# Patient Record
Sex: MALE | Race: WHITE | NOT HISPANIC OR LATINO | Employment: OTHER | URBAN - NONMETROPOLITAN AREA
[De-identification: names, ages, dates, MRNs, and addresses within clinical notes are randomized per-mention and may not be internally consistent; named-entity substitution may affect disease eponyms.]

---

## 2023-04-18 ENCOUNTER — TELEPHONE (OUTPATIENT)
Dept: PRIMARY CARE | Facility: CLINIC | Age: 73
End: 2023-04-18
Payer: MEDICARE

## 2023-04-19 DIAGNOSIS — E78.00 HYPERCHOLESTEROLEMIA: Primary | ICD-10-CM

## 2023-04-19 RX ORDER — CLONAZEPAM 0.5 MG/1
TABLET ORAL
COMMUNITY
Start: 2022-05-06

## 2023-04-19 RX ORDER — ALPRAZOLAM 0.5 MG/1
TABLET ORAL
COMMUNITY
End: 2023-05-17

## 2023-04-19 RX ORDER — ATORVASTATIN CALCIUM 20 MG/1
1 TABLET, FILM COATED ORAL DAILY
COMMUNITY
Start: 2022-01-07 | End: 2023-04-19 | Stop reason: SDUPTHER

## 2023-04-19 RX ORDER — AMOXICILLIN 500 MG/1
1 CAPSULE ORAL
COMMUNITY
Start: 2022-08-29 | End: 2023-05-17

## 2023-04-19 RX ORDER — ATORVASTATIN CALCIUM 20 MG/1
20 TABLET, FILM COATED ORAL DAILY
Qty: 30 TABLET | Refills: 3 | Status: SHIPPED | OUTPATIENT
Start: 2023-04-19 | End: 2023-12-12 | Stop reason: SDUPTHER

## 2023-05-16 LAB
ALANINE AMINOTRANSFERASE (SGPT) (U/L) IN SER/PLAS: 30 U/L (ref 10–52)
ALBUMIN (G/DL) IN SER/PLAS: 4.1 G/DL (ref 3.4–5)
ALKALINE PHOSPHATASE (U/L) IN SER/PLAS: 39 U/L (ref 33–136)
ANION GAP IN SER/PLAS: 9 MMOL/L (ref 10–20)
ASPARTATE AMINOTRANSFERASE (SGOT) (U/L) IN SER/PLAS: 22 U/L (ref 9–39)
BASOPHILS (10*3/UL) IN BLOOD BY AUTOMATED COUNT: 0.04 X10E9/L (ref 0–0.1)
BASOPHILS/100 LEUKOCYTES IN BLOOD BY AUTOMATED COUNT: 0.7 % (ref 0–2)
BILIRUBIN TOTAL (MG/DL) IN SER/PLAS: 0.8 MG/DL (ref 0–1.2)
CALCIUM (MG/DL) IN SER/PLAS: 9 MG/DL (ref 8.6–10.3)
CARBON DIOXIDE, TOTAL (MMOL/L) IN SER/PLAS: 26 MMOL/L (ref 21–32)
CHLORIDE (MMOL/L) IN SER/PLAS: 107 MMOL/L (ref 98–107)
CHOLESTEROL (MG/DL) IN SER/PLAS: 152 MG/DL (ref 0–199)
CHOLESTEROL IN HDL (MG/DL) IN SER/PLAS: 52 MG/DL
CHOLESTEROL/HDL RATIO: 2.9
CREATININE (MG/DL) IN SER/PLAS: 0.98 MG/DL (ref 0.5–1.3)
EOSINOPHILS (10*3/UL) IN BLOOD BY AUTOMATED COUNT: 0.2 X10E9/L (ref 0–0.4)
EOSINOPHILS/100 LEUKOCYTES IN BLOOD BY AUTOMATED COUNT: 3.3 % (ref 0–6)
ERYTHROCYTE DISTRIBUTION WIDTH (RATIO) BY AUTOMATED COUNT: 12.6 % (ref 11.5–14.5)
ERYTHROCYTE MEAN CORPUSCULAR HEMOGLOBIN CONCENTRATION (G/DL) BY AUTOMATED: 33.3 G/DL (ref 32–36)
ERYTHROCYTE MEAN CORPUSCULAR VOLUME (FL) BY AUTOMATED COUNT: 96 FL (ref 80–100)
ERYTHROCYTES (10*6/UL) IN BLOOD BY AUTOMATED COUNT: 4.71 X10E12/L (ref 4.5–5.9)
GFR MALE: 82 ML/MIN/1.73M2
GLUCOSE (MG/DL) IN SER/PLAS: 85 MG/DL (ref 74–99)
HEMATOCRIT (%) IN BLOOD BY AUTOMATED COUNT: 45.3 % (ref 41–52)
HEMOGLOBIN (G/DL) IN BLOOD: 15.1 G/DL (ref 13.5–17.5)
IMMATURE GRANULOCYTES/100 LEUKOCYTES IN BLOOD BY AUTOMATED COUNT: 0.2 % (ref 0–0.9)
LDL: 81 MG/DL (ref 0–99)
LEUKOCYTES (10*3/UL) IN BLOOD BY AUTOMATED COUNT: 6.1 X10E9/L (ref 4.4–11.3)
LYMPHOCYTES (10*3/UL) IN BLOOD BY AUTOMATED COUNT: 2.83 X10E9/L (ref 0.8–3)
LYMPHOCYTES/100 LEUKOCYTES IN BLOOD BY AUTOMATED COUNT: 46.6 % (ref 13–44)
MONOCYTES (10*3/UL) IN BLOOD BY AUTOMATED COUNT: 0.57 X10E9/L (ref 0.05–0.8)
MONOCYTES/100 LEUKOCYTES IN BLOOD BY AUTOMATED COUNT: 9.4 % (ref 2–10)
NEUTROPHILS (10*3/UL) IN BLOOD BY AUTOMATED COUNT: 2.42 X10E9/L (ref 1.6–5.5)
NEUTROPHILS/100 LEUKOCYTES IN BLOOD BY AUTOMATED COUNT: 39.8 % (ref 40–80)
PLATELETS (10*3/UL) IN BLOOD AUTOMATED COUNT: 221 X10E9/L (ref 150–450)
POTASSIUM (MMOL/L) IN SER/PLAS: 4.4 MMOL/L (ref 3.5–5.3)
PROSTATE SPECIFIC ANTIGEN,SCREEN: 0.75 NG/ML (ref 0–4)
PROTEIN TOTAL: 6.5 G/DL (ref 6.4–8.2)
SODIUM (MMOL/L) IN SER/PLAS: 138 MMOL/L (ref 136–145)
TRIGLYCERIDE (MG/DL) IN SER/PLAS: 93 MG/DL (ref 0–149)
UREA NITROGEN (MG/DL) IN SER/PLAS: 14 MG/DL (ref 6–23)
VLDL: 19 MG/DL (ref 0–40)

## 2023-05-17 ENCOUNTER — OFFICE VISIT (OUTPATIENT)
Dept: PRIMARY CARE | Facility: CLINIC | Age: 73
End: 2023-05-17
Payer: MEDICARE

## 2023-05-17 VITALS
OXYGEN SATURATION: 95 % | BODY MASS INDEX: 26.41 KG/M2 | HEART RATE: 73 BPM | SYSTOLIC BLOOD PRESSURE: 122 MMHG | HEIGHT: 70 IN | WEIGHT: 184.5 LBS | DIASTOLIC BLOOD PRESSURE: 83 MMHG

## 2023-05-17 DIAGNOSIS — G89.29 CHRONIC RIGHT SHOULDER PAIN: ICD-10-CM

## 2023-05-17 DIAGNOSIS — I51.9 ASYMPTOMATIC LV DYSFUNCTION: ICD-10-CM

## 2023-05-17 DIAGNOSIS — Z12.5 PROSTATE CANCER SCREENING: ICD-10-CM

## 2023-05-17 DIAGNOSIS — Z86.2 HX OF SARCOIDOSIS: ICD-10-CM

## 2023-05-17 DIAGNOSIS — M25.511 CHRONIC RIGHT SHOULDER PAIN: ICD-10-CM

## 2023-05-17 DIAGNOSIS — R00.2 PALPITATIONS: ICD-10-CM

## 2023-05-17 DIAGNOSIS — E78.2 MIXED HYPERLIPIDEMIA: ICD-10-CM

## 2023-05-17 DIAGNOSIS — Z00.00 MEDICARE ANNUAL WELLNESS VISIT, SUBSEQUENT: Primary | ICD-10-CM

## 2023-05-17 PROBLEM — R94.31 ABNORMAL ELECTROCARDIOGRAM (ECG) (EKG): Status: ACTIVE | Noted: 2022-08-03

## 2023-05-17 PROBLEM — I49.3 PVC (PREMATURE VENTRICULAR CONTRACTION): Status: ACTIVE | Noted: 2022-08-03

## 2023-05-17 PROCEDURE — 99213 OFFICE O/P EST LOW 20 MIN: CPT | Performed by: STUDENT IN AN ORGANIZED HEALTH CARE EDUCATION/TRAINING PROGRAM

## 2023-05-17 PROCEDURE — 1170F FXNL STATUS ASSESSED: CPT | Performed by: STUDENT IN AN ORGANIZED HEALTH CARE EDUCATION/TRAINING PROGRAM

## 2023-05-17 PROCEDURE — 1160F RVW MEDS BY RX/DR IN RCRD: CPT | Performed by: STUDENT IN AN ORGANIZED HEALTH CARE EDUCATION/TRAINING PROGRAM

## 2023-05-17 PROCEDURE — 1159F MED LIST DOCD IN RCRD: CPT | Performed by: STUDENT IN AN ORGANIZED HEALTH CARE EDUCATION/TRAINING PROGRAM

## 2023-05-17 PROCEDURE — 1036F TOBACCO NON-USER: CPT | Performed by: STUDENT IN AN ORGANIZED HEALTH CARE EDUCATION/TRAINING PROGRAM

## 2023-05-17 PROCEDURE — G0439 PPPS, SUBSEQ VISIT: HCPCS | Performed by: STUDENT IN AN ORGANIZED HEALTH CARE EDUCATION/TRAINING PROGRAM

## 2023-05-17 RX ORDER — MULTIVITAMIN
1 TABLET ORAL DAILY
COMMUNITY

## 2023-05-17 RX ORDER — MELOXICAM 15 MG/1
15 TABLET ORAL 3 TIMES WEEKLY
COMMUNITY
Start: 2019-10-31

## 2023-05-17 RX ORDER — TADALAFIL 5 MG/1
5 TABLET ORAL DAILY PRN
COMMUNITY

## 2023-05-17 ASSESSMENT — PATIENT HEALTH QUESTIONNAIRE - PHQ9
SUM OF ALL RESPONSES TO PHQ9 QUESTIONS 1 AND 2: 0
1. LITTLE INTEREST OR PLEASURE IN DOING THINGS: NOT AT ALL
2. FEELING DOWN, DEPRESSED OR HOPELESS: NOT AT ALL

## 2023-05-17 ASSESSMENT — ACTIVITIES OF DAILY LIVING (ADL)
TAKING_MEDICATION: INDEPENDENT
DOING_HOUSEWORK: INDEPENDENT
BATHING: INDEPENDENT
DRESSING: INDEPENDENT
GROCERY_SHOPPING: INDEPENDENT
MANAGING_FINANCES: INDEPENDENT

## 2023-05-17 NOTE — PROGRESS NOTES
"Subjective   Reason for Visit: Dionicio Valdez is an 73 y.o. male here for a Medicare Wellness visit.     Past Medical, Surgical, and Family History reviewed and updated in chart.    Reviewed all medications by prescribing practitioner or clinical pharmacist (such as prescriptions, OTCs, herbal therapies and supplements) and documented in the medical record.    Chief Complaint   Patient presents with    Medicare Annual Wellness Visit Subsequent     1 year/ AWV. Moved up here from [place] and had a chest x-ray due to pain, he was shown to have some asbestos exposure. Very mild. Every year at wellness visits they do a chest x-ray to make sure nothing has changed. He brought those results with him today and denies any pain today or issues.          HPI    Shoulder right: labrum tear repair in .   In the last 6 month he has noticed that his ROM is reduced due to pain. Requesting referral to Ortho.      Once a week uses Tadalafil 5mg. No side-effects.     Cervical neck disc degeneration. Takes Meloxicam as needed.     Anxiety: Uses Clonazepam as needed. Typically once a week.     Going to cardiologist with concerns of mild decrease in cardiac function (45-50%). Stress test was normal. Repeat ECHO in .     History of PVCs as well. Since .     Appendectomy (18yrs age) and hernia ; shoulder surgery ().     Family history of colon cancer at age 38 years. He is recommended to get checked every 5 years. This may be due next year.      Alzheimers mother. Brother lung cancer -  . Another brother  recently with heart attack.        Patient Care Team:  James Moreno MD MPH as PCP - General     Review of Systems    Objective   Vitals:  /83   Pulse 73   Ht 1.77 m (5' 9.69\")   Wt 83.7 kg (184 lb 8 oz)   SpO2 95%   BMI 26.71 kg/m²       Physical Exam  Constitutional:       Appearance: Normal appearance.   Cardiovascular:      Rate and Rhythm: Normal rate and regular rhythm.      " Pulses: Normal pulses.      Heart sounds: Normal heart sounds.   Pulmonary:      Effort: Pulmonary effort is normal.      Breath sounds: Normal breath sounds.   Abdominal:      General: Abdomen is flat. There is no distension.      Palpations: Abdomen is soft. There is no mass.      Tenderness: There is no abdominal tenderness.   Musculoskeletal:      Comments: Right shoulder normal ROM but terminal abduction and flexion are limited due to pain.'   Neurological:      Mental Status: He is alert.             Assessment/Plan   Problem List Items Addressed This Visit          Circulatory    Asymptomatic LV dysfunction            Relevant Medications    tadalafil (Cialis) 5 mg tablet    Other Relevant Orders    CBC and Auto Differential    Comprehensive Metabolic Panel    Palpitations    Relevant Orders    CBC and Auto Differential     Other Visit Diagnoses       Medicare annual wellness visit, subsequent    -  Primary    Hx of sarcoidosis        Relevant Orders    XR chest 2 views    Chronic right shoulder pain        Relevant Orders    Referral to Orthopaedic Surgery    Prostate cancer screening        Relevant Orders    Prostate Specific Antigen, Screen    Mixed hyperlipidemia        Relevant Orders    Lipid Panel             Follow up in a year.

## 2023-09-30 PROBLEM — E66.3 OVERWEIGHT WITH BODY MASS INDEX (BMI) OF 25 TO 25.9 IN ADULT: Status: ACTIVE | Noted: 2023-09-30

## 2023-09-30 RX ORDER — DICLOFENAC SODIUM 10 MG/G
2 GEL TOPICAL 4 TIMES DAILY
COMMUNITY
Start: 2023-06-22

## 2023-10-04 ENCOUNTER — OFFICE VISIT (OUTPATIENT)
Dept: ORTHOPEDIC SURGERY | Facility: CLINIC | Age: 73
End: 2023-10-04
Payer: MEDICARE

## 2023-10-04 VITALS — WEIGHT: 180 LBS | HEIGHT: 70 IN | BODY MASS INDEX: 25.77 KG/M2

## 2023-10-04 DIAGNOSIS — M19.019 GLENOHUMERAL ARTHRITIS: Primary | ICD-10-CM

## 2023-10-04 PROCEDURE — 99214 OFFICE O/P EST MOD 30 MIN: CPT | Performed by: STUDENT IN AN ORGANIZED HEALTH CARE EDUCATION/TRAINING PROGRAM

## 2023-10-04 PROCEDURE — 1036F TOBACCO NON-USER: CPT | Performed by: STUDENT IN AN ORGANIZED HEALTH CARE EDUCATION/TRAINING PROGRAM

## 2023-10-04 PROCEDURE — 1159F MED LIST DOCD IN RCRD: CPT | Performed by: STUDENT IN AN ORGANIZED HEALTH CARE EDUCATION/TRAINING PROGRAM

## 2023-10-04 PROCEDURE — 1160F RVW MEDS BY RX/DR IN RCRD: CPT | Performed by: STUDENT IN AN ORGANIZED HEALTH CARE EDUCATION/TRAINING PROGRAM

## 2023-10-04 PROCEDURE — 1125F AMNT PAIN NOTED PAIN PRSNT: CPT | Performed by: STUDENT IN AN ORGANIZED HEALTH CARE EDUCATION/TRAINING PROGRAM

## 2023-10-04 PROCEDURE — L3670 SO ACRO/CLAV CAN WEB PRE OTS: HCPCS | Performed by: STUDENT IN AN ORGANIZED HEALTH CARE EDUCATION/TRAINING PROGRAM

## 2023-10-04 ASSESSMENT — PAIN - FUNCTIONAL ASSESSMENT: PAIN_FUNCTIONAL_ASSESSMENT: 0-10

## 2023-10-04 ASSESSMENT — PAIN SCALES - GENERAL: PAINLEVEL_OUTOF10: 5 - MODERATE PAIN

## 2023-10-04 NOTE — LETTER
October 4, 2023     James Moreno MD MPH  [address]  [facility] [address]  [place] [address]    Patient: Dionicio Valdez   YOB: 1950   Date of Visit: 10/4/2023       Dear Dr. James Moreno MD MPH:    Thank you for referring Dionicio Valdez to me for evaluation. Below are my notes for this consultation.  If you have questions, please do not hesitate to call me. I look forward to following your patient along with you.       Sincerely,     Scott Mckoy MD      CC: No Recipients  ______________________________________________________________________________________    History of Present Illness   73-year-old male presents today for discussion of proceeding with reverse shoulder arthroplasty.  He has a longstanding history of severe right shoulder arthritis.  He has attempted activity modifications injections with little to no relief.  Wishes to proceed with shoulder replacement     Review of Systems   GENERAL: Negative for malaise, significant weight loss, fever  MUSCULOSKELETAL: see HPI  NEURO:  Negative     Physical Exam  General: No acute distress, alert and oriented x3  Focused examination right shoulder: Overlying skin is clean dry intact normal muscular contour about the shoulder sensation intact to light touch about the median radial ulnar nerve distribution axillary sensation intact.  Active forward elevation 0 to 170 degrees internal rotation to upper lumbar spine.  Painful crepitus with range of motion of the shoulder.     Imaging  Multiple views right shoulder: Severe shoulder arthritis significant retroversion due to severe glenoid wear.     Assessment   73-year-old male with severe right shoulder arthritis     Plan  Risk benefits alternatives of treatment were discussed with Dionicio in detail today.  Using shared informed his making he wishes to proceed with right shoulder reverse arthroplasty, open biceps tenodesis.  Discussed risks of the surgery  to include infection, need for revision surgery, stiffness, pain, instability, fracture.  Despite these risk patient wishes to proceed.  We will obtain medical and cardiac clearance prior to proceeding.  Patient was given a sling today and pain medication anticipation for surgery.      Patient was given Percocet for postoperative pain control.  We will pick this up prior to surgery so that they are not looking for during the day of surgery. I have personally reviewed the OARRS report for this patient. This report is scanned into  the electronic medical record. I have considered the risks of abuse, dependence, addiction, and diversion. They currently report a pain of 8.    Regarding DVT prophylaxis, recommend aspirin 81 mg p.o. twice daily    The risks of surgery were discussed including but not limited to the risks of medications given for surgery, the risk of blood loss during and after surgery that can lead to the need for blood products in certain situations, infection, damage to normal structures that can lead to long term problems of pain or dysfunction, wound healing complications, the possibility of nonunion/malunion of any osteotomies and late or chronic pain as a result of the surgical intervention.  In addition potentially life threatening complications that can occur at the time of surgery and after surgery were discussed including but not limited to deep vein thrombosis, pulmonary embolism, myocardial infarction, stroke and death.

## 2023-10-04 NOTE — PROGRESS NOTES
History of Present Illness   73-year-old male presents today for discussion of proceeding with reverse shoulder arthroplasty.  He has a longstanding history of severe right shoulder arthritis.  He has attempted activity modifications injections with little to no relief.  Wishes to proceed with shoulder replacement     Review of Systems   GENERAL: Negative for malaise, significant weight loss, fever  MUSCULOSKELETAL: see HPI  NEURO:  Negative     Physical Exam  General: No acute distress, alert and oriented x3  Focused examination right shoulder: Overlying skin is clean dry intact normal muscular contour about the shoulder sensation intact to light touch about the median radial ulnar nerve distribution axillary sensation intact.  Active forward elevation 0 to 170 degrees internal rotation to upper lumbar spine.  Painful crepitus with range of motion of the shoulder.     Imaging  Multiple views right shoulder: Severe shoulder arthritis significant retroversion due to severe glenoid wear.     Assessment   73-year-old male with severe right shoulder arthritis     Plan  Risk benefits alternatives of treatment were discussed with Dionicio in detail today.  Using shared informed his making he wishes to proceed with right shoulder reverse arthroplasty, open biceps tenodesis.  Discussed risks of the surgery to include infection, need for revision surgery, stiffness, pain, instability, fracture.  Despite these risk patient wishes to proceed.  We will obtain medical and cardiac clearance prior to proceeding.  Patient was given a sling today and pain medication anticipation for surgery.      Patient was given Percocet for postoperative pain control.  We will pick this up prior to surgery so that they are not looking for during the day of surgery. I have personally reviewed the OARRS report for this patient. This report is scanned into  the electronic medical record. I have considered the risks of abuse, dependence, addiction, and  diversion. They currently report a pain of 8.    Regarding DVT prophylaxis, recommend aspirin 81 mg p.o. twice daily    The risks of surgery were discussed including but not limited to the risks of medications given for surgery, the risk of blood loss during and after surgery that can lead to the need for blood products in certain situations, infection, damage to normal structures that can lead to long term problems of pain or dysfunction, wound healing complications, the possibility of nonunion/malunion of any osteotomies and late or chronic pain as a result of the surgical intervention.  In addition potentially life threatening complications that can occur at the time of surgery and after surgery were discussed including but not limited to deep vein thrombosis, pulmonary embolism, myocardial infarction, stroke and death.

## 2023-10-24 DIAGNOSIS — M25.511 ACUTE PAIN OF RIGHT SHOULDER: ICD-10-CM

## 2023-10-27 ENCOUNTER — OFFICE VISIT (OUTPATIENT)
Dept: PRIMARY CARE | Facility: CLINIC | Age: 73
End: 2023-10-27
Payer: MEDICARE

## 2023-10-27 ENCOUNTER — HOSPITAL ENCOUNTER (OUTPATIENT)
Dept: RADIOLOGY | Facility: HOSPITAL | Age: 73
Discharge: HOME | End: 2023-10-27
Payer: MEDICARE

## 2023-10-27 ENCOUNTER — ANCILLARY PROCEDURE (OUTPATIENT)
Dept: RADIOLOGY | Facility: CLINIC | Age: 73
End: 2023-10-27
Payer: MEDICARE

## 2023-10-27 VITALS
SYSTOLIC BLOOD PRESSURE: 126 MMHG | WEIGHT: 181.1 LBS | BODY MASS INDEX: 25.93 KG/M2 | HEIGHT: 70 IN | HEART RATE: 74 BPM | DIASTOLIC BLOOD PRESSURE: 70 MMHG

## 2023-10-27 DIAGNOSIS — L03.116 CELLULITIS OF LEFT LOWER EXTREMITY: ICD-10-CM

## 2023-10-27 DIAGNOSIS — M25.511 ACUTE PAIN OF RIGHT SHOULDER: ICD-10-CM

## 2023-10-27 DIAGNOSIS — M79.605 PAIN OF LEFT LOWER EXTREMITY DUE TO INJURY: ICD-10-CM

## 2023-10-27 DIAGNOSIS — M79.605 PAIN OF LEFT LOWER EXTREMITY DUE TO INJURY: Primary | ICD-10-CM

## 2023-10-27 PROCEDURE — 73590 X-RAY EXAM OF LOWER LEG: CPT | Mod: LEFT SIDE | Performed by: RADIOLOGY

## 2023-10-27 PROCEDURE — 73200 CT UPPER EXTREMITY W/O DYE: CPT | Mod: RIGHT SIDE | Performed by: STUDENT IN AN ORGANIZED HEALTH CARE EDUCATION/TRAINING PROGRAM

## 2023-10-27 PROCEDURE — 1160F RVW MEDS BY RX/DR IN RCRD: CPT | Performed by: NURSE PRACTITIONER

## 2023-10-27 PROCEDURE — 1159F MED LIST DOCD IN RCRD: CPT | Performed by: NURSE PRACTITIONER

## 2023-10-27 PROCEDURE — 99213 OFFICE O/P EST LOW 20 MIN: CPT | Performed by: NURSE PRACTITIONER

## 2023-10-27 PROCEDURE — 73200 CT UPPER EXTREMITY W/O DYE: CPT | Mod: RT,MG

## 2023-10-27 PROCEDURE — 73590 X-RAY EXAM OF LOWER LEG: CPT | Mod: LT

## 2023-10-27 PROCEDURE — 1036F TOBACCO NON-USER: CPT | Performed by: NURSE PRACTITIONER

## 2023-10-27 PROCEDURE — 1125F AMNT PAIN NOTED PAIN PRSNT: CPT | Performed by: NURSE PRACTITIONER

## 2023-10-27 RX ORDER — DOXYCYCLINE 100 MG/1
100 CAPSULE ORAL 2 TIMES DAILY
Qty: 20 CAPSULE | Refills: 0 | Status: SHIPPED | OUTPATIENT
Start: 2023-10-27 | End: 2023-11-06

## 2023-10-27 NOTE — PROGRESS NOTES
"Subjective   Patient ID: Dionicio Valdez is a 73 y.o. male who presents for Laceration (Left leg - happened 2 weeks ago) and Leg Pain.    2 weeks ago, [place] campground scraped it on wood  Scabbed and swollen , treated with antibacterial ointment at home, wound healed but leg continues to have pain down shin and swelling is slightly worsening.  Pain worsening to LLE, pain shoots down leg into foot   Pain improves at night then starts again in the AM once he puts dora on it  Has been taking tylenol and icing the area.              Review of Systems    Objective   /70 (Patient Position: Sitting)   Pulse 74   Ht 1.778 m (5' 10\")   Wt 82.1 kg (181 lb 1.6 oz)   BMI 25.99 kg/m²     Physical Exam  Vitals reviewed.   Constitutional:       General: He is not in acute distress.     Appearance: Normal appearance.   Musculoskeletal:         General: Swelling, tenderness and signs of injury present.   Skin:     General: Skin is warm and dry.      Findings: Abrasion and wound present.      Comments: 2 scabbed over areas to left LE, lower of the two is warm to touch with mild erythema surrounding the scab  Tender with swelling present    Neurological:      Mental Status: He is alert and oriented to person, place, and time.         Assessment/Plan   Diagnoses and all orders for this visit:  Pain of left lower extremity due to injury  -     XR tibia fibula left 2 views; Future  Rule out bone involvement in injury/infection    Cellulitis of left lower extremity  -     doxycycline (Vibramycin) 100 mg capsule; Take 1 capsule (100 mg) by mouth 2 times a day for 10 days. Take with at least 8 ounces (large glass) of water, do not lie down for 30 minutes after  -     XR tibia fibula left 2 views; Future  Return if symptoms worsen, otherwise follow up at scheduled appointment with PCP        "

## 2023-11-03 ENCOUNTER — OFFICE VISIT (OUTPATIENT)
Dept: PRIMARY CARE | Facility: CLINIC | Age: 73
End: 2023-11-03
Payer: MEDICARE

## 2023-11-03 VITALS
SYSTOLIC BLOOD PRESSURE: 100 MMHG | DIASTOLIC BLOOD PRESSURE: 60 MMHG | OXYGEN SATURATION: 94 % | HEART RATE: 60 BPM | WEIGHT: 185.3 LBS | BODY MASS INDEX: 26.59 KG/M2

## 2023-11-03 DIAGNOSIS — Z01.818 PREOPERATIVE EXAMINATION: Primary | ICD-10-CM

## 2023-11-03 PROCEDURE — 1125F AMNT PAIN NOTED PAIN PRSNT: CPT | Performed by: STUDENT IN AN ORGANIZED HEALTH CARE EDUCATION/TRAINING PROGRAM

## 2023-11-03 PROCEDURE — 99213 OFFICE O/P EST LOW 20 MIN: CPT | Performed by: STUDENT IN AN ORGANIZED HEALTH CARE EDUCATION/TRAINING PROGRAM

## 2023-11-03 PROCEDURE — 1159F MED LIST DOCD IN RCRD: CPT | Performed by: STUDENT IN AN ORGANIZED HEALTH CARE EDUCATION/TRAINING PROGRAM

## 2023-11-03 PROCEDURE — 1160F RVW MEDS BY RX/DR IN RCRD: CPT | Performed by: STUDENT IN AN ORGANIZED HEALTH CARE EDUCATION/TRAINING PROGRAM

## 2023-11-03 PROCEDURE — 1036F TOBACCO NON-USER: CPT | Performed by: STUDENT IN AN ORGANIZED HEALTH CARE EDUCATION/TRAINING PROGRAM

## 2023-11-03 RX ORDER — METOPROLOL SUCCINATE 25 MG/1
25 TABLET, EXTENDED RELEASE ORAL DAILY
COMMUNITY

## 2023-11-03 ASSESSMENT — PATIENT HEALTH QUESTIONNAIRE - PHQ9
2. FEELING DOWN, DEPRESSED OR HOPELESS: NOT AT ALL
SUM OF ALL RESPONSES TO PHQ9 QUESTIONS 1 AND 2: 0
1. LITTLE INTEREST OR PLEASURE IN DOING THINGS: NOT AT ALL

## 2023-11-03 NOTE — PROGRESS NOTES
Subjective   Patient ID: Dionicio Valdez is a 73 y.o. male who presents for Surgery Clearance (PT is here today for surgery clearance on 11/16/23 Ortho. States he is getting his right shoulder replaced. States the office told him that he cardiologist already cleared him saw him 2 weeks ago. ).    HPI    Patient is here for pre surgery optimization.     Reports that cardiology office has already cleared him for surgery.     No acute concerns.   No hx of bleeding diathesis.   No hx of complications from anaesthesia.     Review of Systems  ROS negative except discussed above in HPI.    Vitals:    11/03/23 1003   BP: 100/60   Pulse: 60   SpO2: 94%     Objective   Physical Exam  Constitutional:       Appearance: Normal appearance.   Cardiovascular:      Rate and Rhythm: Normal rate and regular rhythm.   Pulmonary:      Effort: Pulmonary effort is normal.      Breath sounds: Normal breath sounds.   Musculoskeletal:      Cervical back: Normal range of motion and neck supple.   Lymphadenopathy:      Cervical: No cervical adenopathy.   Neurological:      Mental Status: He is alert.       Assessment/Plan   Dionicio was seen today for surgery clearance.  Diagnoses and all orders for this visit:  Preoperative examination (Primary)    Patient is medically stable for the procedure planned.     Follow up as needed.     James Moreno MD MPH

## 2023-11-10 ENCOUNTER — HOSPITAL ENCOUNTER (OUTPATIENT)
Dept: PREADMISSION TESTING | Age: 73
Discharge: HOME | End: 2023-11-14
Attending: STUDENT IN AN ORGANIZED HEALTH CARE EDUCATION/TRAINING PROGRAM | Admitting: STUDENT IN AN ORGANIZED HEALTH CARE EDUCATION/TRAINING PROGRAM
Payer: MEDICARE

## 2023-11-10 VITALS
BODY MASS INDEX: 27.22 KG/M2 | RESPIRATION RATE: 20 BRPM | HEART RATE: 50 BPM | TEMPERATURE: 96.7 F | WEIGHT: 183.8 LBS | DIASTOLIC BLOOD PRESSURE: 62 MMHG | OXYGEN SATURATION: 98 % | SYSTOLIC BLOOD PRESSURE: 117 MMHG | HEIGHT: 69 IN

## 2023-11-10 LAB
ALBUMIN SERPL-MCNC: 4.7 G/DL (ref 3.5–4.6)
ALP SERPL-CCNC: 47 U/L (ref 35–104)
ALT SERPL-CCNC: 36 U/L (ref 0–41)
ANION GAP SERPL CALCULATED.3IONS-SCNC: 13 MEQ/L (ref 9–15)
APTT PPP: 28.9 SEC (ref 24.4–36.8)
AST SERPL-CCNC: 26 U/L (ref 0–40)
BASOPHILS # BLD: 0 K/UL (ref 0–0.2)
BASOPHILS NFR BLD: 0.3 %
BILIRUB SERPL-MCNC: 0.5 MG/DL (ref 0.2–0.7)
BILIRUB UR QL STRIP: NEGATIVE
BUN SERPL-MCNC: 12 MG/DL (ref 8–23)
CALCIUM SERPL-MCNC: 9.4 MG/DL (ref 8.5–9.9)
CHLORIDE SERPL-SCNC: 105 MEQ/L (ref 95–107)
CLARITY UR: CLEAR
CO2 SERPL-SCNC: 24 MEQ/L (ref 20–31)
COLOR UR: YELLOW
CREAT SERPL-MCNC: 0.73 MG/DL (ref 0.7–1.2)
EOSINOPHIL # BLD: 0.1 K/UL (ref 0–0.7)
EOSINOPHIL NFR BLD: 1.6 %
ERYTHROCYTE [DISTWIDTH] IN BLOOD BY AUTOMATED COUNT: 12.6 % (ref 11.5–14.5)
GLOBULIN SER CALC-MCNC: 2.5 G/DL (ref 2.3–3.5)
GLUCOSE SERPL-MCNC: 87 MG/DL (ref 70–99)
GLUCOSE UR STRIP-MCNC: NEGATIVE MG/DL
HCT VFR BLD AUTO: 44.8 % (ref 42–52)
HGB BLD-MCNC: 15 G/DL (ref 14–18)
HGB UR QL STRIP: NEGATIVE
INR PPP: 1.1
KETONES UR STRIP-MCNC: NEGATIVE MG/DL
LEUKOCYTE ESTERASE UR QL STRIP: NEGATIVE
LYMPHOCYTES # BLD: 1.8 K/UL (ref 1–4.8)
LYMPHOCYTES NFR BLD: 28 %
MCH RBC QN AUTO: 32.5 PG (ref 27–31.3)
MCHC RBC AUTO-ENTMCNC: 33.5 % (ref 33–37)
MCV RBC AUTO: 97 FL (ref 79–92.2)
MONOCYTES # BLD: 0.6 K/UL (ref 0.2–0.8)
MONOCYTES NFR BLD: 8.6 %
NEUTROPHILS # BLD: 3.9 K/UL (ref 1.4–6.5)
NEUTS SEG NFR BLD: 61.2 %
NITRITE UR QL STRIP: NEGATIVE
PH UR STRIP: 6 [PH] (ref 5–9)
PLATELET # BLD AUTO: 221 K/UL (ref 130–400)
POTASSIUM SERPL-SCNC: 4.3 MEQ/L (ref 3.4–4.9)
PROT SERPL-MCNC: 7.2 G/DL (ref 6.3–8)
PROT UR STRIP-MCNC: NEGATIVE MG/DL
PROTHROMBIN TIME: 14.2 SEC (ref 12.3–14.9)
RBC # BLD AUTO: 4.62 M/UL (ref 4.7–6.1)
SODIUM SERPL-SCNC: 142 MEQ/L (ref 135–144)
SP GR UR STRIP: 1.01 (ref 1–1.03)
URINE REFLEX TO CULTURE: NORMAL
UROBILINOGEN UR STRIP-ACNC: 0.2 E.U./DL
WBC # BLD AUTO: 6.4 K/UL (ref 4.8–10.8)

## 2023-11-10 PROCEDURE — 85025 COMPLETE CBC W/AUTO DIFF WBC: CPT

## 2023-11-10 PROCEDURE — 93005 ELECTROCARDIOGRAM TRACING: CPT | Performed by: STUDENT IN AN ORGANIZED HEALTH CARE EDUCATION/TRAINING PROGRAM

## 2023-11-10 PROCEDURE — 86901 BLOOD TYPING SEROLOGIC RH(D): CPT

## 2023-11-10 PROCEDURE — 81003 URINALYSIS AUTO W/O SCOPE: CPT

## 2023-11-10 PROCEDURE — 85730 THROMBOPLASTIN TIME PARTIAL: CPT

## 2023-11-10 PROCEDURE — 86850 RBC ANTIBODY SCREEN: CPT

## 2023-11-10 PROCEDURE — 86900 BLOOD TYPING SEROLOGIC ABO: CPT

## 2023-11-10 PROCEDURE — 80053 COMPREHEN METABOLIC PANEL: CPT

## 2023-11-10 PROCEDURE — 87641 MR-STAPH DNA AMP PROBE: CPT

## 2023-11-10 PROCEDURE — 85610 PROTHROMBIN TIME: CPT

## 2023-11-10 RX ORDER — TADALAFIL 5 MG/1
5 TABLET ORAL DAILY PRN
COMMUNITY

## 2023-11-10 RX ORDER — METOPROLOL SUCCINATE 25 MG/1
25 TABLET, EXTENDED RELEASE ORAL DAILY
COMMUNITY
Start: 2023-10-10

## 2023-11-10 RX ORDER — ATORVASTATIN CALCIUM 20 MG/1
20 TABLET, FILM COATED ORAL DAILY
COMMUNITY
Start: 2019-10-23

## 2023-11-10 RX ORDER — ACETAMINOPHEN 500 MG
500 TABLET ORAL EVERY 6 HOURS PRN
COMMUNITY

## 2023-11-10 NOTE — PROGRESS NOTES
Patient had cardiac clearance 10/10/2023 Dr. Gabriella Wilkins, paper copy in chart. EKG done @ PAT appt, ekg faxed to Dr. Marely Naylor to review.

## 2023-11-11 LAB
ABO + RH BLD: NORMAL
BLD GP AB SCN SERPL QL: NORMAL
MRSA, DNA, NASAL: NEGATIVE
SPECIMEN DESCRIPTION: NORMAL

## 2023-11-13 LAB
EKG ATRIAL RATE: 55 BPM
EKG P AXIS: 83 DEGREES
EKG P-R INTERVAL: 100 MS
EKG Q-T INTERVAL: 462 MS
EKG QRS DURATION: 150 MS
EKG QTC CALCULATION (BAZETT): 441 MS
EKG R AXIS: 117 DEGREES
EKG T AXIS: 19 DEGREES
EKG VENTRICULAR RATE: 55 BPM

## 2023-11-13 PROCEDURE — 93010 ELECTROCARDIOGRAM REPORT: CPT | Performed by: INTERNAL MEDICINE

## 2023-11-15 ENCOUNTER — OFFICE VISIT (OUTPATIENT)
Dept: ORTHOPEDIC SURGERY | Facility: CLINIC | Age: 73
End: 2023-11-15
Payer: MEDICARE

## 2023-11-15 ENCOUNTER — ANESTHESIA EVENT (OUTPATIENT)
Dept: OPERATING ROOM | Age: 73
End: 2023-11-15
Payer: MEDICARE

## 2023-11-15 DIAGNOSIS — M19.019 GLENOHUMERAL ARTHRITIS: Primary | ICD-10-CM

## 2023-11-15 PROCEDURE — 1159F MED LIST DOCD IN RCRD: CPT | Performed by: STUDENT IN AN ORGANIZED HEALTH CARE EDUCATION/TRAINING PROGRAM

## 2023-11-15 PROCEDURE — 1160F RVW MEDS BY RX/DR IN RCRD: CPT | Performed by: STUDENT IN AN ORGANIZED HEALTH CARE EDUCATION/TRAINING PROGRAM

## 2023-11-15 PROCEDURE — 1126F AMNT PAIN NOTED NONE PRSNT: CPT | Performed by: STUDENT IN AN ORGANIZED HEALTH CARE EDUCATION/TRAINING PROGRAM

## 2023-11-15 PROCEDURE — 1036F TOBACCO NON-USER: CPT | Performed by: STUDENT IN AN ORGANIZED HEALTH CARE EDUCATION/TRAINING PROGRAM

## 2023-11-15 RX ORDER — OXYCODONE AND ACETAMINOPHEN 5; 325 MG/1; MG/1
1 TABLET ORAL EVERY 6 HOURS PRN
Qty: 28 TABLET | Refills: 0 | Status: SHIPPED | OUTPATIENT
Start: 2023-11-15 | End: 2023-11-22

## 2023-11-15 ASSESSMENT — PAIN SCALES - GENERAL: PAINLEVEL_OUTOF10: 0 - NO PAIN

## 2023-11-15 ASSESSMENT — PAIN - FUNCTIONAL ASSESSMENT: PAIN_FUNCTIONAL_ASSESSMENT: 0-10

## 2023-11-15 NOTE — PROGRESS NOTES
HPI   This is the pre-operative appointment for Dionicio Valdez . Patient is ready to proceed with right reverse total shoulder arthroplasty on 11/16/2023 at [facility]. The patient has failed to improve with multiple non-operative modalities.  There is increasing difficulty with activities of daily living.  The patient is endorsing severe pain and disability and would like to proceed with surgery.     Exam  MSK: Patient AROM in office today is 90 degrees in forward flexion, 90 degrees abduction, 60 degrees external rotation. Internal rotation to the sacrum    Plan    This is the preop visit to discuss the risks and benefits of the TSA surgery. These risks were fully explained to the patient. With this, and any surgery, infection is a risk; this is usually 1-2%, even higher in diabetics, persons with rheumatoid arthritis, previous surgeries, on oral steroids and obesity. All of these issues were properly addressed; and we assure all sterile techniques will be followed. Patients will also need to be on antibiotics for the next 2-years for any minor surgery, even dental work. For high risk patients, this will be for a lifetime. Severe infections may require removal of the prosthesis. It was also explained to the patient that there will be some blood loss during the procedure and blood transfusion may be recommended if medically necessary. PE and blood clots are also discussed with the patient and that these can be a fatal complication to the surgery.     It is explained to the patient that this type of surgery is to decrease arthritis pain and that range of motion may be variable after surgery. Loosening and wear of the prosthesis are also discussed. The prosthesis normally lasts 12 to 15 years on average. Revisions may be more complicated and higher risk. Fractures, though rare, may also occur intraoperatively. These fractures may be to the humerus or glenoid. There may be nerve or arterial injuries as well and these are  discussed in detail. Lastly, the benefits of regional anesthesia were explained to the patient.     Post-operative pain perscription of Percocet was perscribed and the patient was advised not to take the perscription until after the surgery. OARRS has been reviewed and is consistent with prescribed medications. This report is scanned into the electronic medical record. The risks of abuse, dependence, addiction and diversion were considered. The medication is felt to be clinically appropriate.     Patient received sling. Follow up 2-weeks postop for a wound check with four-view right shoulder x-rays. All questions and concerns were answered with the patient and Dr. Scott Mckoy III

## 2023-11-16 ENCOUNTER — ANESTHESIA (OUTPATIENT)
Dept: OPERATING ROOM | Age: 73
End: 2023-11-16
Payer: MEDICARE

## 2023-11-16 ENCOUNTER — APPOINTMENT (OUTPATIENT)
Dept: GENERAL RADIOLOGY | Age: 73
End: 2023-11-16
Attending: STUDENT IN AN ORGANIZED HEALTH CARE EDUCATION/TRAINING PROGRAM
Payer: MEDICARE

## 2023-11-16 ENCOUNTER — HOSPITAL ENCOUNTER (OUTPATIENT)
Age: 73
Setting detail: OBSERVATION
Discharge: HOME | End: 2023-11-17
Attending: STUDENT IN AN ORGANIZED HEALTH CARE EDUCATION/TRAINING PROGRAM | Admitting: STUDENT IN AN ORGANIZED HEALTH CARE EDUCATION/TRAINING PROGRAM
Payer: MEDICARE

## 2023-11-16 DIAGNOSIS — Z96.611 STATUS POST TOTAL SHOULDER REPLACEMENT, RIGHT: Primary | ICD-10-CM

## 2023-11-16 PROCEDURE — 2709999900 HC NON-CHARGEABLE SUPPLY: Performed by: STUDENT IN AN ORGANIZED HEALTH CARE EDUCATION/TRAINING PROGRAM

## 2023-11-16 PROCEDURE — 3600000014 HC SURGERY LEVEL 4 ADDTL 15MIN: Performed by: STUDENT IN AN ORGANIZED HEALTH CARE EDUCATION/TRAINING PROGRAM

## 2023-11-16 PROCEDURE — 3700000001 HC ADD 15 MINUTES (ANESTHESIA): Performed by: STUDENT IN AN ORGANIZED HEALTH CARE EDUCATION/TRAINING PROGRAM

## 2023-11-16 PROCEDURE — 64415 NJX AA&/STRD BRCH PLXS IMG: CPT | Performed by: STUDENT IN AN ORGANIZED HEALTH CARE EDUCATION/TRAINING PROGRAM

## 2023-11-16 PROCEDURE — 2580000003 HC RX 258: Performed by: STUDENT IN AN ORGANIZED HEALTH CARE EDUCATION/TRAINING PROGRAM

## 2023-11-16 PROCEDURE — 7100000000 HC PACU RECOVERY - FIRST 15 MIN: Performed by: STUDENT IN AN ORGANIZED HEALTH CARE EDUCATION/TRAINING PROGRAM

## 2023-11-16 PROCEDURE — C1776 JOINT DEVICE (IMPLANTABLE): HCPCS | Performed by: STUDENT IN AN ORGANIZED HEALTH CARE EDUCATION/TRAINING PROGRAM

## 2023-11-16 PROCEDURE — 2720000010 HC SURG SUPPLY STERILE: Performed by: STUDENT IN AN ORGANIZED HEALTH CARE EDUCATION/TRAINING PROGRAM

## 2023-11-16 PROCEDURE — 6370000000 HC RX 637 (ALT 250 FOR IP): Performed by: NURSE PRACTITIONER

## 2023-11-16 PROCEDURE — 23430 REPAIR BICEPS TENDON: CPT | Performed by: STUDENT IN AN ORGANIZED HEALTH CARE EDUCATION/TRAINING PROGRAM

## 2023-11-16 PROCEDURE — 3600000004 HC SURGERY LEVEL 4 BASE: Performed by: STUDENT IN AN ORGANIZED HEALTH CARE EDUCATION/TRAINING PROGRAM

## 2023-11-16 PROCEDURE — 6360000002 HC RX W HCPCS: Performed by: NURSE PRACTITIONER

## 2023-11-16 PROCEDURE — 6360000002 HC RX W HCPCS

## 2023-11-16 PROCEDURE — G0378 HOSPITAL OBSERVATION PER HR: HCPCS

## 2023-11-16 PROCEDURE — C1713 ANCHOR/SCREW BN/BN,TIS/BN: HCPCS | Performed by: STUDENT IN AN ORGANIZED HEALTH CARE EDUCATION/TRAINING PROGRAM

## 2023-11-16 PROCEDURE — 6360000002 HC RX W HCPCS: Performed by: STUDENT IN AN ORGANIZED HEALTH CARE EDUCATION/TRAINING PROGRAM

## 2023-11-16 PROCEDURE — 3700000000 HC ANESTHESIA ATTENDED CARE: Performed by: STUDENT IN AN ORGANIZED HEALTH CARE EDUCATION/TRAINING PROGRAM

## 2023-11-16 PROCEDURE — 94150 VITAL CAPACITY TEST: CPT

## 2023-11-16 PROCEDURE — 7100000001 HC PACU RECOVERY - ADDTL 15 MIN: Performed by: STUDENT IN AN ORGANIZED HEALTH CARE EDUCATION/TRAINING PROGRAM

## 2023-11-16 PROCEDURE — 23472 RECONSTRUCT SHOULDER JOINT: CPT | Performed by: STUDENT IN AN ORGANIZED HEALTH CARE EDUCATION/TRAINING PROGRAM

## 2023-11-16 PROCEDURE — 2580000003 HC RX 258: Performed by: NURSE PRACTITIONER

## 2023-11-16 PROCEDURE — 73020 X-RAY EXAM OF SHOULDER: CPT

## 2023-11-16 PROCEDURE — 2500000003 HC RX 250 WO HCPCS

## 2023-11-16 PROCEDURE — 2500000003 HC RX 250 WO HCPCS: Performed by: STUDENT IN AN ORGANIZED HEALTH CARE EDUCATION/TRAINING PROGRAM

## 2023-11-16 DEVICE — IMPLANTABLE DEVICE: Type: IMPLANTABLE DEVICE | Site: SHOULDER | Status: FUNCTIONAL

## 2023-11-16 DEVICE — SCREW BONE L26MM DIA5MM TI ST FULL THRD PERIPH FOR GLEN: Type: IMPLANTABLE DEVICE | Site: SHOULDER | Status: FUNCTIONAL

## 2023-11-16 DEVICE — TRAY HUM THK+0MM 3.5MM OFFSET SHLDR HI REVERSED AEQUALIS: Type: IMPLANTABLE DEVICE | Site: SHOULDER | Status: FUNCTIONAL

## 2023-11-16 DEVICE — INSERT SHLDR C DIA39MM THK+6MM 7.5DEG REVERSED AEQUALIS: Type: IMPLANTABLE DEVICE | Site: SHOULDER | Status: FUNCTIONAL

## 2023-11-16 DEVICE — POST GLEN PRSS FT SHT 7 MM SHLDR REVERSED AEQUALIS PERFORMA: Type: IMPLANTABLE DEVICE | Site: SHOULDER | Status: FUNCTIONAL

## 2023-11-16 DEVICE — SCREW BONE L30MM DIA5MM TI ST FULL THRD PERIPH FOR GLEN: Type: IMPLANTABLE DEVICE | Site: SHOULDER | Status: FUNCTIONAL

## 2023-11-16 DEVICE — BASEPLATE GLEN OD25MM 15DEG HALF WDG AUG REVERSED AEQUALIS: Type: IMPLANTABLE DEVICE | Site: SHOULDER | Status: FUNCTIONAL

## 2023-11-16 RX ORDER — SENNA AND DOCUSATE SODIUM 50; 8.6 MG/1; MG/1
1 TABLET, FILM COATED ORAL 2 TIMES DAILY
Status: DISCONTINUED | OUTPATIENT
Start: 2023-11-16 | End: 2023-11-17 | Stop reason: HOSPADM

## 2023-11-16 RX ORDER — OXYCODONE HYDROCHLORIDE 5 MG/1
5 TABLET ORAL EVERY 4 HOURS PRN
Status: DISCONTINUED | OUTPATIENT
Start: 2023-11-16 | End: 2023-11-17 | Stop reason: HOSPADM

## 2023-11-16 RX ORDER — SODIUM CHLORIDE 9 MG/ML
INJECTION, SOLUTION INTRAVENOUS CONTINUOUS
Status: DISCONTINUED | OUTPATIENT
Start: 2023-11-16 | End: 2023-11-17 | Stop reason: HOSPADM

## 2023-11-16 RX ORDER — MIDAZOLAM HYDROCHLORIDE 1 MG/ML
INJECTION INTRAMUSCULAR; INTRAVENOUS
Status: COMPLETED | OUTPATIENT
Start: 2023-11-16 | End: 2023-11-16

## 2023-11-16 RX ORDER — CYCLOBENZAPRINE HCL 5 MG
5 TABLET ORAL EVERY 6 HOURS
Status: DISCONTINUED | OUTPATIENT
Start: 2023-11-16 | End: 2023-11-17 | Stop reason: HOSPADM

## 2023-11-16 RX ORDER — ACETAMINOPHEN 500 MG
1000 TABLET ORAL ONCE
Status: COMPLETED | OUTPATIENT
Start: 2023-11-16 | End: 2023-11-16

## 2023-11-16 RX ORDER — FENTANYL CITRATE 50 UG/ML
INJECTION, SOLUTION INTRAMUSCULAR; INTRAVENOUS PRN
Status: DISCONTINUED | OUTPATIENT
Start: 2023-11-16 | End: 2023-11-16 | Stop reason: SDUPTHER

## 2023-11-16 RX ORDER — ACETAMINOPHEN 325 MG/1
650 TABLET ORAL
Status: DISCONTINUED | OUTPATIENT
Start: 2023-11-16 | End: 2023-11-16 | Stop reason: HOSPADM

## 2023-11-16 RX ORDER — SODIUM CHLORIDE 0.9 % (FLUSH) 0.9 %
5-40 SYRINGE (ML) INJECTION EVERY 12 HOURS SCHEDULED
Status: DISCONTINUED | OUTPATIENT
Start: 2023-11-16 | End: 2023-11-17 | Stop reason: HOSPADM

## 2023-11-16 RX ORDER — MORPHINE SULFATE 2 MG/ML
2 INJECTION, SOLUTION INTRAMUSCULAR; INTRAVENOUS
Status: DISCONTINUED | OUTPATIENT
Start: 2023-11-16 | End: 2023-11-17 | Stop reason: HOSPADM

## 2023-11-16 RX ORDER — SODIUM CHLORIDE 0.9 % (FLUSH) 0.9 %
5-40 SYRINGE (ML) INJECTION EVERY 12 HOURS SCHEDULED
Status: DISCONTINUED | OUTPATIENT
Start: 2023-11-16 | End: 2023-11-16 | Stop reason: HOSPADM

## 2023-11-16 RX ORDER — HYDROXYZINE HYDROCHLORIDE 10 MG/1
10 TABLET, FILM COATED ORAL EVERY 8 HOURS PRN
Status: DISCONTINUED | OUTPATIENT
Start: 2023-11-16 | End: 2023-11-17 | Stop reason: HOSPADM

## 2023-11-16 RX ORDER — SODIUM CHLORIDE 9 MG/ML
INJECTION, SOLUTION INTRAVENOUS PRN
Status: DISCONTINUED | OUTPATIENT
Start: 2023-11-16 | End: 2023-11-17 | Stop reason: HOSPADM

## 2023-11-16 RX ORDER — ACETAMINOPHEN 325 MG/1
650 TABLET ORAL EVERY 6 HOURS
Status: DISCONTINUED | OUTPATIENT
Start: 2023-11-16 | End: 2023-11-17 | Stop reason: HOSPADM

## 2023-11-16 RX ORDER — MAGNESIUM HYDROXIDE 1200 MG/15ML
LIQUID ORAL CONTINUOUS PRN
Status: DISCONTINUED | OUTPATIENT
Start: 2023-11-16 | End: 2023-11-16 | Stop reason: HOSPADM

## 2023-11-16 RX ORDER — DEXAMETHASONE SODIUM PHOSPHATE 10 MG/ML
INJECTION INTRAMUSCULAR; INTRAVENOUS PRN
Status: DISCONTINUED | OUTPATIENT
Start: 2023-11-16 | End: 2023-11-16 | Stop reason: SDUPTHER

## 2023-11-16 RX ORDER — MAGNESIUM HYDROXIDE/ALUMINUM HYDROXICE/SIMETHICONE 120; 1200; 1200 MG/30ML; MG/30ML; MG/30ML
15 SUSPENSION ORAL EVERY 6 HOURS PRN
Status: DISCONTINUED | OUTPATIENT
Start: 2023-11-16 | End: 2023-11-17 | Stop reason: HOSPADM

## 2023-11-16 RX ORDER — KETOROLAC TROMETHAMINE 15 MG/ML
7.5 INJECTION, SOLUTION INTRAMUSCULAR; INTRAVENOUS EVERY 6 HOURS
Status: COMPLETED | OUTPATIENT
Start: 2023-11-16 | End: 2023-11-17

## 2023-11-16 RX ORDER — SODIUM CHLORIDE 9 MG/ML
INJECTION, SOLUTION INTRAVENOUS PRN
Status: DISCONTINUED | OUTPATIENT
Start: 2023-11-16 | End: 2023-11-16 | Stop reason: HOSPADM

## 2023-11-16 RX ORDER — SODIUM CHLORIDE 0.9 % (FLUSH) 0.9 %
5-40 SYRINGE (ML) INJECTION PRN
Status: DISCONTINUED | OUTPATIENT
Start: 2023-11-16 | End: 2023-11-17 | Stop reason: HOSPADM

## 2023-11-16 RX ORDER — KETOROLAC TROMETHAMINE 30 MG/ML
INJECTION, SOLUTION INTRAMUSCULAR; INTRAVENOUS PRN
Status: DISCONTINUED | OUTPATIENT
Start: 2023-11-16 | End: 2023-11-16 | Stop reason: SDUPTHER

## 2023-11-16 RX ORDER — ONDANSETRON 2 MG/ML
INJECTION INTRAMUSCULAR; INTRAVENOUS PRN
Status: DISCONTINUED | OUTPATIENT
Start: 2023-11-16 | End: 2023-11-16 | Stop reason: SDUPTHER

## 2023-11-16 RX ORDER — ONDANSETRON 2 MG/ML
4 INJECTION INTRAMUSCULAR; INTRAVENOUS
Status: COMPLETED | OUTPATIENT
Start: 2023-11-16 | End: 2023-11-16

## 2023-11-16 RX ORDER — ROPIVACAINE HYDROCHLORIDE 5 MG/ML
INJECTION, SOLUTION EPIDURAL; INFILTRATION; PERINEURAL
Status: COMPLETED | OUTPATIENT
Start: 2023-11-16 | End: 2023-11-16

## 2023-11-16 RX ORDER — ONDANSETRON 2 MG/ML
4 INJECTION INTRAMUSCULAR; INTRAVENOUS EVERY 6 HOURS PRN
Status: DISCONTINUED | OUTPATIENT
Start: 2023-11-16 | End: 2023-11-17 | Stop reason: HOSPADM

## 2023-11-16 RX ORDER — VANCOMYCIN HYDROCHLORIDE 1 G/20ML
INJECTION, POWDER, LYOPHILIZED, FOR SOLUTION INTRAVENOUS PRN
Status: DISCONTINUED | OUTPATIENT
Start: 2023-11-16 | End: 2023-11-16 | Stop reason: HOSPADM

## 2023-11-16 RX ORDER — OXYCODONE HYDROCHLORIDE 5 MG/1
5 CAPSULE ORAL
Status: DISCONTINUED | OUTPATIENT
Start: 2023-11-16 | End: 2023-11-16 | Stop reason: HOSPADM

## 2023-11-16 RX ORDER — ONDANSETRON 4 MG/1
4 TABLET, ORALLY DISINTEGRATING ORAL EVERY 8 HOURS PRN
Status: DISCONTINUED | OUTPATIENT
Start: 2023-11-16 | End: 2023-11-17 | Stop reason: HOSPADM

## 2023-11-16 RX ORDER — OXYCODONE HCL 10 MG/1
10 TABLET, FILM COATED, EXTENDED RELEASE ORAL ONCE
Status: COMPLETED | OUTPATIENT
Start: 2023-11-16 | End: 2023-11-16

## 2023-11-16 RX ORDER — TRANEXAMIC ACID 650 MG/1
1950 TABLET ORAL
Status: DISCONTINUED | OUTPATIENT
Start: 2023-11-16 | End: 2023-11-16 | Stop reason: HOSPADM

## 2023-11-16 RX ORDER — SODIUM CHLORIDE, SODIUM LACTATE, POTASSIUM CHLORIDE, CALCIUM CHLORIDE 600; 310; 30; 20 MG/100ML; MG/100ML; MG/100ML; MG/100ML
INJECTION, SOLUTION INTRAVENOUS CONTINUOUS
Status: DISCONTINUED | OUTPATIENT
Start: 2023-11-16 | End: 2023-11-17 | Stop reason: HOSPADM

## 2023-11-16 RX ORDER — GLYCOPYRROLATE 1 MG/5 ML
SYRINGE (ML) INTRAVENOUS PRN
Status: DISCONTINUED | OUTPATIENT
Start: 2023-11-16 | End: 2023-11-16 | Stop reason: SDUPTHER

## 2023-11-16 RX ORDER — PROPOFOL 10 MG/ML
INJECTION, EMULSION INTRAVENOUS PRN
Status: DISCONTINUED | OUTPATIENT
Start: 2023-11-16 | End: 2023-11-16 | Stop reason: SDUPTHER

## 2023-11-16 RX ORDER — OXYCODONE HYDROCHLORIDE 5 MG/1
2.5 TABLET ORAL EVERY 4 HOURS PRN
Status: DISCONTINUED | OUTPATIENT
Start: 2023-11-16 | End: 2023-11-17 | Stop reason: HOSPADM

## 2023-11-16 RX ORDER — SODIUM CHLORIDE 0.9 % (FLUSH) 0.9 %
5-40 SYRINGE (ML) INJECTION PRN
Status: DISCONTINUED | OUTPATIENT
Start: 2023-11-16 | End: 2023-11-16 | Stop reason: HOSPADM

## 2023-11-16 RX ORDER — FENTANYL CITRATE 0.05 MG/ML
25 INJECTION, SOLUTION INTRAMUSCULAR; INTRAVENOUS EVERY 5 MIN PRN
Status: COMPLETED | OUTPATIENT
Start: 2023-11-16 | End: 2023-11-16

## 2023-11-16 RX ORDER — ROCURONIUM BROMIDE 10 MG/ML
INJECTION, SOLUTION INTRAVENOUS PRN
Status: DISCONTINUED | OUTPATIENT
Start: 2023-11-16 | End: 2023-11-16 | Stop reason: SDUPTHER

## 2023-11-16 RX ORDER — CELECOXIB 200 MG/1
200 CAPSULE ORAL ONCE
Status: COMPLETED | OUTPATIENT
Start: 2023-11-16 | End: 2023-11-16

## 2023-11-16 RX ADMIN — SODIUM CHLORIDE: 9 INJECTION, SOLUTION INTRAVENOUS at 18:33

## 2023-11-16 RX ADMIN — ONDANSETRON 4 MG: 2 INJECTION INTRAMUSCULAR; INTRAVENOUS at 15:42

## 2023-11-16 RX ADMIN — SODIUM CHLORIDE, POTASSIUM CHLORIDE, SODIUM LACTATE AND CALCIUM CHLORIDE 1000 ML: 600; 310; 30; 20 INJECTION, SOLUTION INTRAVENOUS at 11:43

## 2023-11-16 RX ADMIN — ROCURONIUM BROMIDE 20 MG: 10 INJECTION, SOLUTION INTRAVENOUS at 15:26

## 2023-11-16 RX ADMIN — ROCURONIUM BROMIDE 30 MG: 10 INJECTION, SOLUTION INTRAVENOUS at 14:41

## 2023-11-16 RX ADMIN — KETOROLAC TROMETHAMINE 7.5 MG: 15 INJECTION, SOLUTION INTRAMUSCULAR; INTRAVENOUS at 22:42

## 2023-11-16 RX ADMIN — Medication 0.1 MG: at 14:40

## 2023-11-16 RX ADMIN — CEFAZOLIN 2000 MG: 2 INJECTION, POWDER, FOR SOLUTION INTRAMUSCULAR; INTRAVENOUS at 22:38

## 2023-11-16 RX ADMIN — OXYCODONE HYDROCHLORIDE 10 MG: 10 TABLET, FILM COATED, EXTENDED RELEASE ORAL at 12:03

## 2023-11-16 RX ADMIN — ACETAMINOPHEN 650 MG: 325 TABLET ORAL at 22:42

## 2023-11-16 RX ADMIN — ACETAMINOPHEN 1000 MG: 500 TABLET ORAL at 12:02

## 2023-11-16 RX ADMIN — DEXAMETHASONE SODIUM PHOSPHATE 10 MG: 10 INJECTION INTRAMUSCULAR; INTRAVENOUS at 14:10

## 2023-11-16 RX ADMIN — CEFAZOLIN 2000 MG: 2 INJECTION, POWDER, FOR SOLUTION INTRAMUSCULAR; INTRAVENOUS at 14:05

## 2023-11-16 RX ADMIN — SODIUM CHLORIDE, PRESERVATIVE FREE 10 ML: 5 INJECTION INTRAVENOUS at 22:42

## 2023-11-16 RX ADMIN — KETOROLAC TROMETHAMINE 30 MG: 30 INJECTION, SOLUTION INTRAMUSCULAR; INTRAVENOUS at 15:51

## 2023-11-16 RX ADMIN — PROPOFOL 150 MG: 10 INJECTION, EMULSION INTRAVENOUS at 13:57

## 2023-11-16 RX ADMIN — FENTANYL CITRATE 50 MCG: 50 INJECTION, SOLUTION INTRAMUSCULAR; INTRAVENOUS at 15:39

## 2023-11-16 RX ADMIN — ONDANSETRON 4 MG: 2 INJECTION INTRAMUSCULAR; INTRAVENOUS at 16:30

## 2023-11-16 RX ADMIN — TRANEXAMIC ACID 1950 MG: 650 TABLET, FILM COATED ORAL at 12:03

## 2023-11-16 RX ADMIN — FENTANYL CITRATE 25 MCG: 0.05 INJECTION, SOLUTION INTRAMUSCULAR; INTRAVENOUS at 16:31

## 2023-11-16 RX ADMIN — FENTANYL CITRATE 25 MCG: 0.05 INJECTION, SOLUTION INTRAMUSCULAR; INTRAVENOUS at 16:17

## 2023-11-16 RX ADMIN — OXYCODONE 2.5 MG: 5 TABLET ORAL at 20:53

## 2023-11-16 RX ADMIN — CYCLOBENZAPRINE HYDROCHLORIDE 5 MG: 5 TABLET, FILM COATED ORAL at 22:42

## 2023-11-16 RX ADMIN — SODIUM CHLORIDE, POTASSIUM CHLORIDE, SODIUM LACTATE AND CALCIUM CHLORIDE: 600; 310; 30; 20 INJECTION, SOLUTION INTRAVENOUS at 14:45

## 2023-11-16 RX ADMIN — CELECOXIB 200 MG: 200 CAPSULE ORAL at 12:02

## 2023-11-16 RX ADMIN — ROPIVACAINE HYDROCHLORIDE 20 ML: 5 INJECTION, SOLUTION EPIDURAL; INFILTRATION; PERINEURAL at 13:34

## 2023-11-16 RX ADMIN — MIDAZOLAM HYDROCHLORIDE 2 MG: 1 INJECTION, SOLUTION INTRAMUSCULAR; INTRAVENOUS at 13:34

## 2023-11-16 RX ADMIN — FENTANYL CITRATE 50 MCG: 50 INJECTION, SOLUTION INTRAMUSCULAR; INTRAVENOUS at 13:57

## 2023-11-16 RX ADMIN — SUGAMMADEX 200 MG: 100 INJECTION, SOLUTION INTRAVENOUS at 15:53

## 2023-11-16 RX ADMIN — ROCURONIUM BROMIDE 70 MG: 10 INJECTION, SOLUTION INTRAVENOUS at 13:57

## 2023-11-16 RX ADMIN — SENNOSIDES AND DOCUSATE SODIUM 1 TABLET: 8.6; 5 TABLET ORAL at 22:42

## 2023-11-16 ASSESSMENT — PAIN SCALES - GENERAL
PAINLEVEL_OUTOF10: 2
PAINLEVEL_OUTOF10: 3
PAINLEVEL_OUTOF10: 2
PAINLEVEL_OUTOF10: 4
PAINLEVEL_OUTOF10: 5
PAINLEVEL_OUTOF10: 3
PAINLEVEL_OUTOF10: 0
PAINLEVEL_OUTOF10: 3
PAINLEVEL_OUTOF10: 3
PAINLEVEL_OUTOF10: 4
PAINLEVEL_OUTOF10: 3
PAINLEVEL_OUTOF10: 0
PAINLEVEL_OUTOF10: 4

## 2023-11-16 ASSESSMENT — PAIN DESCRIPTION - ORIENTATION
ORIENTATION: RIGHT

## 2023-11-16 ASSESSMENT — PAIN - FUNCTIONAL ASSESSMENT
PAIN_FUNCTIONAL_ASSESSMENT: PREVENTS OR INTERFERES SOME ACTIVE ACTIVITIES AND ADLS
PAIN_FUNCTIONAL_ASSESSMENT: PREVENTS OR INTERFERES SOME ACTIVE ACTIVITIES AND ADLS
PAIN_FUNCTIONAL_ASSESSMENT: 0-10
PAIN_FUNCTIONAL_ASSESSMENT: PREVENTS OR INTERFERES WITH ALL ACTIVE AND SOME PASSIVE ACTIVITIES

## 2023-11-16 ASSESSMENT — PAIN DESCRIPTION - LOCATION
LOCATION: SHOULDER
LOCATION: SHOULDER
LOCATION: SHOULDER;OTHER (COMMENT)
LOCATION: SHOULDER

## 2023-11-16 ASSESSMENT — PAIN DESCRIPTION - DESCRIPTORS
DESCRIPTORS: ACHING;SHARP
DESCRIPTORS: ACHING;SHARP
DESCRIPTORS: SHARP;ACHING
DESCRIPTORS: ACHING
DESCRIPTORS: ACHING

## 2023-11-16 ASSESSMENT — LIFESTYLE VARIABLES
HOW MANY STANDARD DRINKS CONTAINING ALCOHOL DO YOU HAVE ON A TYPICAL DAY: PATIENT DOES NOT DRINK
HOW OFTEN DO YOU HAVE A DRINK CONTAINING ALCOHOL: NEVER

## 2023-11-16 ASSESSMENT — ENCOUNTER SYMPTOMS: SHORTNESS OF BREATH: 0

## 2023-11-16 NOTE — ANESTHESIA PROCEDURE NOTES
Peripheral Block    Patient location during procedure: pre-op  Reason for block: post-op pain management and at surgeon's request  Start time: 11/16/2023 1:34 PM  End time: 11/16/2023 1:40 PM  Staffing  Performed: anesthesiologist   Anesthesiologist: Gerhard Ybarra MD  Performed by: Gerhard Ybarra MD  Authorized by: Gerhard Ybarra MD    Preanesthetic Checklist  Completed: patient identified, IV checked, site marked, risks and benefits discussed, surgical/procedural consents, equipment checked, pre-op evaluation, timeout performed, anesthesia consent given, oxygen available and monitors applied/VS acknowledged  Peripheral Block   Patient position: supine  Prep: ChloraPrep  Provider prep: mask and sterile gloves  Patient monitoring: cardiac monitor, continuous pulse ox, frequent blood pressure checks and IV access  Block type: Brachial plexus  Interscalene  Laterality: right  Injection technique: single-shot  Guidance: ultrasound guided  Local infiltration: lidocaine  Infiltration strength: 1 %  Local infiltration: lidocaine  Dose: 3 mL    Needle   Needle type: combined needle/nerve stimulator   Needle gauge: 22 G  Needle localization: anatomical landmarks, ultrasound guidance and paresthesias  Needle length: 5 cm  Assessment   Injection assessment: negative aspiration for heme, no paresthesia on injection, local visualized surrounding nerve on ultrasound and no intravascular symptoms  Paresthesia pain: immediately resolved  Slow fractionated injection: yes  Hemodynamics: stable  Real-time US image taken/store: yes  Outcomes: uncomplicated and patient tolerated procedure well    Additional Notes  Ultrasound image in chart. Consent obtained and timeout performed. Sedation provided with 2mg IV Versed. Site prepped in sterile fashion. Ultrasound used to visualize neurovascular structures and skin anesthetized with 3cc of 1% lidocaine.  Needle advanced under US guidance, local anesthetic

## 2023-11-16 NOTE — PLAN OF CARE
Problem: Discharge Planning  Goal: Discharge to home or other facility with appropriate resources  Outcome: Progressing  Flowsheets (Taken 11/16/2023 7775)  Discharge to home or other facility with appropriate resources: Refer to discharge planning if patient needs post-hospital services based on physician order or complex needs related to functional status, cognitive ability or social support system     Problem: Pain  Goal: Verbalizes/displays adequate comfort level or baseline comfort level  Outcome: Progressing     Problem: Safety - Adult  Goal: Free from fall injury  Outcome: Progressing     Problem: ABCDS Injury Assessment  Goal: Absence of physical injury  Outcome: Progressing

## 2023-11-16 NOTE — ANESTHESIA POSTPROCEDURE EVALUATION
Department of Anesthesiology  Postprocedure Note    Patient: Juan Carlos Bass  MRN: [id number]  YOB: 1950  Date of evaluation: 11/16/2023      Procedure Summary     Date: 11/16/23 Room / Location: [address]    Anesthesia Start: 5820 Anesthesia Stop: 1070    Procedure: RIGHT SHOULDER TOTAL ARTHROPLASTY REVERSE, OPEN BICEPS TENODESIS (Right: Shoulder) Diagnosis:       Arthritis of right shoulder region      Biceps tendinitis on right      (Arthritis of right shoulder region [M19.011])      (Biceps tendinitis on right [M75.21])    Surgeons: Bria Kilpatrick MD Responsible Provider: Nahid Cordero MD    Anesthesia Type: general, regional ASA Status: 2          Anesthesia Type: No value filed.     Page Phase I: Page Score: 9    Page Phase II:        Anesthesia Post Evaluation    Patient location during evaluation: bedside  Patient participation: complete - patient participated  Level of consciousness: awake and awake and alert  Airway patency: patent  Nausea & Vomiting: no nausea and no vomiting  Complications: no  Cardiovascular status: blood pressure returned to baseline and hemodynamically stable  Respiratory status: acceptable  Hydration status: euvolemic  Pain management: adequate

## 2023-11-16 NOTE — PROGRESS NOTES
Patient ID:  Jarrett Ruiz  [id number]  68 y.o.  1950  BOVIE PAD SITE CLEAR AND INTACT PRE AND POST OP. TAKEN TO PACU,   ATTACHED TO MONITOR AND REPORT GIVEN TO RN.   VSS DRSG DRY AND INTACT, SLING ON  DENTURES IN LABELED DENTURE CUP AND GLASSES BOTH IN LABELED BAG ON PATIENT BED      Electronically signed by Shanna Orr RN on 11/16/2023

## 2023-11-16 NOTE — OP NOTE
Operative Note      Patient: Hakeem Burciaga  YOB: 1950  MRN: [id number]    Date of Procedure: 11/16/2023    Pre-Op Diagnosis Codes:     * Arthritis of right shoulder region [M19.011]     * Biceps tendinitis on right [M75.21]    Post-Op Diagnosis: Same       Procedure(s):  RIGHT SHOULDER TOTAL ARTHROPLASTY REVERSE, OPEN BICEPS TENODESIS    Surgeon(s):  Gail Talbot MD    Assistant:   Physician Assistant: JULIUS Patrick    Anesthesia: General    Estimated Blood Loss (mL): less than 112     Complications: None    Specimens:   * No specimens in log *    Implants:  Implant Name Type Inv. Item Serial No.  Lot No. LRB No. Used Action   POST BRANDO PRSS FT SHT 7 MM SHLDR REVERSED AEQUALIS PERFORMA - [id number]  POST BRANDO PRSS FT SHT 7 MM SHLDR REVERSED AEQUALIS PERFORMA [id number] [facility] [id number] Right 1 Implanted   BASEPLATE BRANDO [id number] 60REY HALF WDG AUG REVERSED AEQUALIS - [id number]  BASEPLATE BRANDO [id number] 03HZH HALF WDG AUG REVERSED AEQUALIS [id number] [id number] [id number] Right 1 Implanted   SPHERE BRANDO LUBNA 3+ MM 39 MM LAT COCR STRL PERFORM LF - [id number]  SPHERE BRANDO LUBNA 3+ MM 39 MM LAT COCR STRL PERFORM LF [id number] [employer] [id number] Right 1 Implanted   SCREW BONE L26MM DIA5MM TI ST FULL THRD PERIPH FOR BRANDO - [id number]  SCREW BONE L26MM DIA5MM TI ST FULL THRD PERIPH FOR BRANDO [id number] [facility]  Right 3 Implanted   SCREW BONE L30MM DIA5MM TI ST FULL THRD PERIPH FOR BRANDO - [id number]  SCREW BONE L30MM DIA5MM TI ST FULL THRD PERIPH FOR BRANDO [id number] [facility]  Right 1 Implanted   TRAY HUM THK+0MM 3.5MM OFFSET SHLDR HI REVERSED AEQUALIS - [id number]  TRAY HUM THK+0MM 3.5MM OFFSET SHLDR HI REVERSED AEQUALIS [id number] [employer] [id number] Right 1 Implanted   INSERT SHLDR C [id number] THK+6MM 7. 5DEG REVERSED SABIHA - [id number]  INSERT SHLDR C [id number] THK+6MM 7. 5DEG REVERSED remove underlying cartilage. Ensuring that there is no remaining cartilage about the glenoid to get a good bony bed for ingrowth. The boss reamer followed by the peg hole reamer were then used to create a glenoid vault. The baseplate was then placed. This was impacted in place with a mallet. Excellent fixation was obtained. A combination o of cortical and locking screws were placed. The glenosphere was then impacted into place with [facility] taper and secured with already placed interdigitating screw. A trial humeral baseplate was applied with trial polyethylene liners. The shoulder was reduced and taken through range of motion ensuring no impingement as well as good full range of motion with a stable shoulder joint. Patient was able to passively achieve full forward elevation 60 degrees of external rotation internal rotation to lumbar spine without difficulty. After selecting appropriate baseplate and trial components the selected components were assembled on the back table. The wound was copiously irrigated with Irrisept solution. The components were then impacted into place in about 30 degrees of retroversion as was previously broached. The resected polyethylene liner was subsequently impacted in place ensuring that it was completely seated. The shoulder was reduced taken through full range of motion it was felt to be very well stable appropriate deltoid tension good internal and external rotation as well as forward elevation without obvious source of impingement. The wound was copiously irrigated again with Irrisept solution. Layered closure was performed with 0 Vicryl, 2-0 Vicryl, 3-0 Monocryl Dermabond    Dry sterile dressings were then applied. The patient was then awakened from anesthetic, transferred to PACU and taken to the recovery room in stable medical condition. PA template Rylee Northeim certified was present through the entire procedure.   Her skills were necessary to assist

## 2023-11-16 NOTE — PROGRESS NOTES
Pt doing well voicing no complints wife at bedside. Pt taking l fluids jayden checks intact. Spoke with anethesia pt baseline since surgery pt  has  been  sinus henri  pt  asymptomatic.

## 2023-11-17 VITALS
DIASTOLIC BLOOD PRESSURE: 61 MMHG | SYSTOLIC BLOOD PRESSURE: 107 MMHG | RESPIRATION RATE: 17 BRPM | HEART RATE: 56 BPM | HEIGHT: 69 IN | BODY MASS INDEX: 27.2 KG/M2 | WEIGHT: 183.64 LBS | TEMPERATURE: 97.5 F | OXYGEN SATURATION: 95 %

## 2023-11-17 LAB
ANION GAP SERPL CALCULATED.3IONS-SCNC: 8 MEQ/L (ref 9–15)
BASOPHILS # BLD: 0 K/UL (ref 0–0.2)
BASOPHILS NFR BLD: 0.1 %
BUN SERPL-MCNC: 12 MG/DL (ref 8–23)
CALCIUM SERPL-MCNC: 8.1 MG/DL (ref 8.5–9.9)
CHLORIDE SERPL-SCNC: 103 MEQ/L (ref 95–107)
CO2 SERPL-SCNC: 24 MEQ/L (ref 20–31)
CREAT SERPL-MCNC: 0.83 MG/DL (ref 0.7–1.2)
EOSINOPHIL # BLD: 0 K/UL (ref 0–0.7)
EOSINOPHIL NFR BLD: 0 %
ERYTHROCYTE [DISTWIDTH] IN BLOOD BY AUTOMATED COUNT: 12.5 % (ref 11.5–14.5)
GLUCOSE SERPL-MCNC: 110 MG/DL (ref 70–99)
HCT VFR BLD AUTO: 37.3 % (ref 42–52)
HGB BLD-MCNC: 12.5 G/DL (ref 14–18)
LYMPHOCYTES # BLD: 1.3 K/UL (ref 1–4.8)
LYMPHOCYTES NFR BLD: 11.1 %
MCH RBC QN AUTO: 32.3 PG (ref 27–31.3)
MCHC RBC AUTO-ENTMCNC: 33.5 % (ref 33–37)
MCV RBC AUTO: 96.4 FL (ref 79–92.2)
MONOCYTES # BLD: 1 K/UL (ref 0.2–0.8)
MONOCYTES NFR BLD: 7.9 %
NEUTROPHILS # BLD: 9.7 K/UL (ref 1.4–6.5)
NEUTS SEG NFR BLD: 80.5 %
PLATELET # BLD AUTO: 196 K/UL (ref 130–400)
POTASSIUM SERPL-SCNC: 4.8 MEQ/L (ref 3.4–4.9)
RBC # BLD AUTO: 3.87 M/UL (ref 4.7–6.1)
SODIUM SERPL-SCNC: 135 MEQ/L (ref 135–144)
WBC # BLD AUTO: 12 K/UL (ref 4.8–10.8)

## 2023-11-17 PROCEDURE — 96374 THER/PROPH/DIAG INJ IV PUSH: CPT

## 2023-11-17 PROCEDURE — 6360000002 HC RX W HCPCS: Performed by: NURSE PRACTITIONER

## 2023-11-17 PROCEDURE — G0378 HOSPITAL OBSERVATION PER HR: HCPCS

## 2023-11-17 PROCEDURE — 85025 COMPLETE CBC W/AUTO DIFF WBC: CPT

## 2023-11-17 PROCEDURE — 6370000000 HC RX 637 (ALT 250 FOR IP): Performed by: NURSE PRACTITIONER

## 2023-11-17 PROCEDURE — 2580000003 HC RX 258: Performed by: NURSE PRACTITIONER

## 2023-11-17 PROCEDURE — 96376 TX/PRO/DX INJ SAME DRUG ADON: CPT

## 2023-11-17 PROCEDURE — 96361 HYDRATE IV INFUSION ADD-ON: CPT

## 2023-11-17 PROCEDURE — 80048 BASIC METABOLIC PNL TOTAL CA: CPT

## 2023-11-17 PROCEDURE — 36415 COLL VENOUS BLD VENIPUNCTURE: CPT

## 2023-11-17 PROCEDURE — 97161 PT EVAL LOW COMPLEX 20 MIN: CPT

## 2023-11-17 PROCEDURE — 97166 OT EVAL MOD COMPLEX 45 MIN: CPT

## 2023-11-17 RX ORDER — CYCLOBENZAPRINE HCL 5 MG
5 TABLET ORAL EVERY 6 HOURS PRN
Qty: 15 TABLET | Refills: 0 | Status: SHIPPED | OUTPATIENT
Start: 2023-11-17 | End: 2023-11-22

## 2023-11-17 RX ORDER — OXYCODONE HYDROCHLORIDE 5 MG/1
5 TABLET ORAL EVERY 6 HOURS PRN
Qty: 28 TABLET | Refills: 0 | Status: SHIPPED | OUTPATIENT
Start: 2023-11-17 | End: 2023-11-24

## 2023-11-17 RX ORDER — SENNA AND DOCUSATE SODIUM 50; 8.6 MG/1; MG/1
1 TABLET, FILM COATED ORAL 2 TIMES DAILY
Qty: 60 TABLET | Refills: 0 | Status: SHIPPED | OUTPATIENT
Start: 2023-11-17 | End: 2023-12-17

## 2023-11-17 RX ADMIN — CEFAZOLIN 2000 MG: 2 INJECTION, POWDER, FOR SOLUTION INTRAMUSCULAR; INTRAVENOUS at 05:58

## 2023-11-17 RX ADMIN — OXYCODONE 2.5 MG: 5 TABLET ORAL at 12:15

## 2023-11-17 RX ADMIN — ACETAMINOPHEN 650 MG: 325 TABLET ORAL at 04:15

## 2023-11-17 RX ADMIN — CYCLOBENZAPRINE HYDROCHLORIDE 5 MG: 5 TABLET, FILM COATED ORAL at 04:15

## 2023-11-17 RX ADMIN — KETOROLAC TROMETHAMINE 7.5 MG: 15 INJECTION, SOLUTION INTRAMUSCULAR; INTRAVENOUS at 04:19

## 2023-11-17 RX ADMIN — KETOROLAC TROMETHAMINE 7.5 MG: 15 INJECTION, SOLUTION INTRAMUSCULAR; INTRAVENOUS at 10:14

## 2023-11-17 RX ADMIN — ACETAMINOPHEN 650 MG: 325 TABLET ORAL at 10:14

## 2023-11-17 RX ADMIN — SODIUM CHLORIDE: 9 INJECTION, SOLUTION INTRAVENOUS at 05:57

## 2023-11-17 RX ADMIN — CYCLOBENZAPRINE HYDROCHLORIDE 5 MG: 5 TABLET, FILM COATED ORAL at 10:14

## 2023-11-17 RX ADMIN — SODIUM CHLORIDE, PRESERVATIVE FREE 10 ML: 5 INJECTION INTRAVENOUS at 08:59

## 2023-11-17 RX ADMIN — SENNOSIDES AND DOCUSATE SODIUM 1 TABLET: 8.6; 5 TABLET ORAL at 08:59

## 2023-11-17 ASSESSMENT — PAIN SCALES - GENERAL
PAINLEVEL_OUTOF10: 5
PAINLEVEL_OUTOF10: 1
PAINLEVEL_OUTOF10: 1
PAINLEVEL_OUTOF10: 5

## 2023-11-17 ASSESSMENT — PAIN DESCRIPTION - LOCATION: LOCATION: BACK

## 2023-11-17 NOTE — DISCHARGE SUMMARY
Discharge summary  This patient Ned Gorman was admitted to the hospital on 11/16/2023  after undergoing Procedure(s) (LRB):  RIGHT SHOULDER TOTAL ARTHROPLASTY REVERSE, OPEN BICEPS TENODESIS (Right) without complications that morning. During the postoperative period,while in hospital, patient was medically managed by the hospitalist. Please see medial notes and H&P for patients additional diagnoses. Ortho agrees with all medical diagnoses and treatments while patient in hospital.  No significant or unexpected findings or abnormal ortho imaging were noted during the hospital stay    Hospital course  Patient tolerated surgical procedure well and there was no complications. Patient progressed adequtly through their recovery during hospital stay including PT and rehabilitation. DVT prophylaxis was implemented POD#1  Patient was then D/C on  to Home  in stable condition. Patient was instructed on the use of pain medications, the signs and symptoms of infection, and was given our number to call should they have any questions or concerns following discharge.

## 2023-11-17 NOTE — PROGRESS NOTES
Physical Therapy Med Surg Initial Assessment  [facility]Department[facility]  [facility]       NAME: Jonathan Muhammad  : 1950 (68 y.o.)  MRN: [id number]  CODE STATUS: Full Code    Date of Service: 2023    Patient Diagnosis(es): Arthritis of right shoulder region [M19.011]  Biceps tendinitis on right [M75.21]  Status post total shoulder replacement, right [Z96.611]   No chief complaint on file. Patient Active Problem List    Diagnosis Date Noted    Status post total shoulder replacement, right 2023        Past Medical History:   Diagnosis Date    Hyperlipidemia      Past Surgical History:   Procedure Laterality Date    APPENDECTOMY      COLONOSCOPY      HERNIA REPAIR      [facility]    SHOULDER ARTHROSCOPY Right        Chart Reviewed: Yes  Patient assessed for rehabilitation services?: Yes    Restrictions:  Restrictions/Precautions: Fall Risk, Weight Bearing  Upper Extremity Weight Bearing Restrictions  Right Upper Extremity Weight Bearing: Non Weight Bearing     SUBJECTIVE: Subjective: Patient is agreeable to PT evaluation.     Pain   3/10    Post Treatment Pain Screening:    3/10 RN aware    Prior Level of Function:  Social/Functional History  Lives With: Spouse  Type of Home: House  Home Layout: One level  Home Access: Stairs to enter with rails  Entrance Stairs - Number of Steps: 2  Entrance Stairs - Rails: Left  Bathroom Shower/Tub: Tub/Shower unit  Bathroom Toilet: Standard  Bathroom Equipment: Hand-held shower  Has the patient had two or more falls in the past year or any fall with injury in the past year?: No  ADL Assistance: Independent  Homemaking Assistance: Independent  Ambulation Assistance: Independent  Transfer Assistance: Independent  Active : Yes  Mode of Transportation: Car  Occupation: Retired    OBJECTIVE:   Hearing: Within functional limits    Cognition:  Orientation Level: Oriented X4  Follows Commands: Within Functional Therapy Time:   Individual   Time In 0851   Time Out 0905   Minutes 14   Timed Code Treatment Minutes: 0 Minutes       Jordyn Gibbs, PT, 11/17/23 at 10:10 AM         Definitions for assistance levels  Independent = pt does not require any physical supervision or assistance from another person for activity completion. Device may be needed.   Stand by assistance = pt requires verbal cues or instructions from another person, close to but not touching, to perform the activity  Minimal assistance= pt performs 75% or more of the activity; assistance is required to complete the activity  Moderate assistance= pt performs 50% of the activity; assistance is required to complete the activity  Maximal assistance = pt performs 25% of the activity; assistance is required to complete the activity  Dependent = pt requires total physical assistance to accomplish the task

## 2023-11-17 NOTE — PLAN OF CARE
Therapy evaluation completed. Please see daily notes and/or progress notes for details related to planned treatment interventions, goals and functional performance.      Electronically signed by Delfino Rivera PT on 11/17/2023 at 10:10 AM

## 2023-11-17 NOTE — DISCHARGE INSTRUCTIONS
Total Shoulder Replacement  Discharge Instructions    Surgical Site Care:    Change dressing once a day and PRN (as needed). Apply 4 x 4 sponge and light tape. If glue present, leave open to air. You may leave wound open to air after initial dressing removal, if wound is clean, dry and intact  If Aquacel Ag dressing is present, do not remove dressing for 7 days, unless heavily saturated. If heavily saturated, remove dressing and start using instructions above  Staples will be removed on post-operative day 14 and steri-strips applied  Showering is permitted starting POD1 if waterproof Aquacel dressing is present or when the incision is covered with 4 x 4 and Tegaderm waterproof dressing  Until all areas of incision are healed. Physical Therapy:  Weight Bearing Status:  NWB ( none weight bearing)  No ROM of Shoulder  Active and passive range of motion of elbow, wrist, hand  Per Physical Therapy handout  Sling to be applied at all times when out of bed. Ok to remove sling  for hygiene and when awake in bed with support  Pain Medications  You were given {RP NARCOTICS LXXX:12225}  Wean off pain medications as you deem appropriate as long as pain is under control  Contact the [facility] if you notice any reactions to the medication or need a refill  Cold packs/Ice packs/Machine  May be used 3 times daily for 15-30 minutes as necessary  Be sure to have a barrier (cloth, clothing, towel) between the site and the ice pack to prevent frostbite  Contact the [facility] office if  Increased redness, swelling, drainage of any kind, and/or pain to surgery site. As well as new onset fevers and or chills. These could signify an infection. Arm tenderness to touch as well as increased swelling or redness. This could signify a clot formation. Numbness or tingling to an area around the incision site or below the incision site (fingers). Any rash appears, increased  or new onset nausea/vomiting occur.

## 2023-11-17 NOTE — CARE COORDINATION
MET WITH PT AND SPOUSE AT BEDSIDE. PT FROM HOME W/WIFE. INDEPENDENT. NO DME, NO O2, NO HD, NO VA.   DC PLAN TO RETURN HOME.  DENIES NEEDS

## 2023-11-17 NOTE — FLOWSHEET NOTE
Pt awake and up to chair. Has been working with PT/OT this morning up and ambulating around unit. Ate good for breakfast. Accepted po med without problem. RUE sling in place. Aquacel is c/d/I. Pt reports mild numbness in thumb and some pain starting in upper arm area. Call light in reach. VSS. Respirations even and nonlabored. 1200 Discharge instructions reviewed with pt and wife. Understanding verbalized. RUE sling in place. Aquacel c/d/I. Pt has good sensation, color and warmth to RUE.

## 2023-11-17 NOTE — DISCHARGE INSTR - DIET

## 2023-11-22 ENCOUNTER — TELEPHONE (OUTPATIENT)
Dept: ORTHOPEDIC SURGERY | Facility: CLINIC | Age: 73
End: 2023-11-22
Payer: MEDICARE

## 2023-11-22 NOTE — TELEPHONE ENCOUNTER
Spoke with patient and he is doing well and just wanted to know if he's to take the dressing off or wait till post op appointment.   Patient advised that its okay to wait until his post op appointment.

## 2023-11-29 ENCOUNTER — OFFICE VISIT (OUTPATIENT)
Dept: ORTHOPEDIC SURGERY | Facility: CLINIC | Age: 73
End: 2023-11-29
Payer: MEDICARE

## 2023-11-29 ENCOUNTER — APPOINTMENT (OUTPATIENT)
Dept: ORTHOPEDIC SURGERY | Facility: CLINIC | Age: 73
End: 2023-11-29
Payer: MEDICARE

## 2023-11-29 ENCOUNTER — CLINICAL SUPPORT (OUTPATIENT)
Dept: ORTHOPEDIC SURGERY | Facility: CLINIC | Age: 73
End: 2023-11-29
Payer: MEDICARE

## 2023-11-29 DIAGNOSIS — Z96.611 S/P REVERSE TOTAL SHOULDER ARTHROPLASTY, RIGHT: Primary | ICD-10-CM

## 2023-11-29 DIAGNOSIS — M19.019 GLENOHUMERAL ARTHRITIS: ICD-10-CM

## 2023-11-29 DIAGNOSIS — M25.511 ACUTE PAIN OF RIGHT SHOULDER: ICD-10-CM

## 2023-11-29 PROCEDURE — 1159F MED LIST DOCD IN RCRD: CPT | Performed by: STUDENT IN AN ORGANIZED HEALTH CARE EDUCATION/TRAINING PROGRAM

## 2023-11-29 PROCEDURE — 1036F TOBACCO NON-USER: CPT | Performed by: STUDENT IN AN ORGANIZED HEALTH CARE EDUCATION/TRAINING PROGRAM

## 2023-11-29 PROCEDURE — 99024 POSTOP FOLLOW-UP VISIT: CPT | Performed by: STUDENT IN AN ORGANIZED HEALTH CARE EDUCATION/TRAINING PROGRAM

## 2023-11-29 PROCEDURE — 73030 X-RAY EXAM OF SHOULDER: CPT | Mod: RIGHT SIDE | Performed by: STUDENT IN AN ORGANIZED HEALTH CARE EDUCATION/TRAINING PROGRAM

## 2023-11-29 PROCEDURE — 1126F AMNT PAIN NOTED NONE PRSNT: CPT | Performed by: STUDENT IN AN ORGANIZED HEALTH CARE EDUCATION/TRAINING PROGRAM

## 2023-11-29 PROCEDURE — 1160F RVW MEDS BY RX/DR IN RCRD: CPT | Performed by: STUDENT IN AN ORGANIZED HEALTH CARE EDUCATION/TRAINING PROGRAM

## 2023-11-29 NOTE — PROGRESS NOTES
History of Present Illness:  Patient returns today after right reverse total shoulder arthroplasty.  He is 2 weeks from surgery. Patient endorses moderate pain. Is no longer taking pain medicine.   Denies any numbness tingling or shortness of breath. Also denies fevers or incision drainage.    Physical Examination:  Right shoulder:  Mild swelling  Incision healthy, no drainage or erythema  Normal sensation over deltoid  Tolerates gentle passive forward flexion to 30 degrees   Strength testing deferred   Neurovascular exam normal distally    Imaging   Multiple views of the right shoulder     Assessment  Patient status post right reverse total shoulder arthroplasty, 2 weeks out     Plan:  Surgical details discussed.  Encouraged non-opioid pain medications and encouraged icing   Discussed sling wear and activity restrictions.  Discussed physical therapy plan. Will start PROM only 30/90/90 protocol until follow up. Was given order today.  Follow-up 1 month with 3 view right shoulder x-rays     In a face to face encounter, I evaluated the patient and performed a physical examination, discussed pertinent diagnostic studies if indicated and discussed diagnosis and management strategies with both the patient and physician assistant / nurse practitioner.  I reviewed the PA/NP's note and agree with the documented findings and plan of care.       Scott Mckoy III, MD

## 2023-12-12 ENCOUNTER — TELEPHONE (OUTPATIENT)
Dept: PRIMARY CARE | Facility: CLINIC | Age: 73
End: 2023-12-12
Payer: MEDICARE

## 2023-12-12 DIAGNOSIS — E78.00 HYPERCHOLESTEROLEMIA: ICD-10-CM

## 2023-12-12 RX ORDER — ATORVASTATIN CALCIUM 20 MG/1
20 TABLET, FILM COATED ORAL DAILY
Qty: 90 TABLET | Refills: 1 | Status: SHIPPED | OUTPATIENT
Start: 2023-12-12 | End: 2024-06-09

## 2023-12-12 NOTE — TELEPHONE ENCOUNTER
Please send refill to [facility] on [address]  atorvastatin (Lipitor) 20 mg tablet [[id number]]  DISCONTINUED    Order Details  Dose: 1 tablet Route: oral Frequency: Daily   Dispense Quantity: -- Refills: --          Sig: Take 1 tablet (20 mg) by mouth once daily.         Start Date: 01/07/22 End Date: 04/19/23   Discontinued by: James Moreno MD MPH on 4/19/2023 10:50   Reason: Reorder

## 2023-12-13 ENCOUNTER — EVALUATION (OUTPATIENT)
Dept: PHYSICAL THERAPY | Facility: CLINIC | Age: 73
End: 2023-12-13
Payer: MEDICARE

## 2023-12-13 DIAGNOSIS — M25.511 ACUTE PAIN OF RIGHT SHOULDER: Primary | ICD-10-CM

## 2023-12-13 DIAGNOSIS — Z96.611 S/P REVERSE TOTAL SHOULDER ARTHROPLASTY, RIGHT: ICD-10-CM

## 2023-12-13 DIAGNOSIS — M25.611 SHOULDER STIFFNESS, RIGHT: ICD-10-CM

## 2023-12-13 PROCEDURE — 97140 MANUAL THERAPY 1/> REGIONS: CPT | Mod: GP | Performed by: PHYSICAL THERAPIST

## 2023-12-13 PROCEDURE — 97110 THERAPEUTIC EXERCISES: CPT | Mod: GP | Performed by: PHYSICAL THERAPIST

## 2023-12-13 PROCEDURE — 97161 PT EVAL LOW COMPLEX 20 MIN: CPT | Mod: GP | Performed by: PHYSICAL THERAPIST

## 2023-12-13 ASSESSMENT — PAIN - FUNCTIONAL ASSESSMENT: PAIN_FUNCTIONAL_ASSESSMENT: 0-10

## 2023-12-13 ASSESSMENT — PAIN SCALES - GENERAL: PAINLEVEL_OUTOF10: 0 - NO PAIN

## 2023-12-13 ASSESSMENT — ACTIVITIES OF DAILY LIVING (ADL): ADL_ASSISTANCE: INDEPENDENT

## 2023-12-13 NOTE — PROGRESS NOTES
Physical Therapy    Physical Therapy Evaluation and Treatment      Patient Name: Dionicio Valdez  MRN: [id number]  Today's Date: 12/13/2023  Time Calculation  Start Time: 1330  Stop Time: 1500  Time Calculation (min): 90 min    Assessment:  Patient presents with deficits in right shoulder, elbow and forearm ROM, strength and functional mobility s/p R shoulder reverse total shoulder.  He is sill using sling.  Patient was unable to fully extend R elbow or fully supinate/pronate.  PT did manual therapy with patient and he was able to achieve full ROM by end of session.  He has significant tightness in muscles around shoulder due to immobility and not using.  PT did manual therapy to reduce fascial restrictions in shoulder and elbow/forearm with good response.  Plan to continue with skilled PT intervention 2x/week and follow surgeons protocol in chart.  PT Assessment  PT Assessment Results: Decreased strength, Decreased range of motion, Decreased endurance, Pain, Orthopedic restrictions  Rehab Prognosis: Poor     Plan:  OP PT Plan  Treatment/Interventions: Cryotherapy, Education/ Instruction, Hot pack, Manual therapy, Neuromuscular re-education, Self care/ home management, Therapeutic exercises (may use IASTM at elbow/forearm, outer shoulder, other areas after 6 weeks; scar mobilization; joint mobilization R forearm/elbow)  PT Plan: Skilled PT  PT Frequency: 2 times per week  Duration:  (8 weeks)  Onset Date: 11/16/23  Rehab Potential: Good  Plan of Care Agreement: Patient  The physical therapist of record is the therapist who assumes primary responsibility for patient management and as such is held accountable for the coordination, continuation and progression of the POC.    This patient's care and PT of record will be shared with Vilma Molina, PT at [facility]      Current Problem:   1. Acute pain of right shoulder  Follow Up In Physical Therapy      2. S/P reverse total shoulder arthroplasty, right  Referral  "to Physical Therapy    Follow Up In Physical Therapy      3. Shoulder stiffness, right  Follow Up In Physical Therapy          Subjective    General:  General  Reason for Referral: acute pain right shoulder, s/p reverse total shoulder arthroplasty, right  Referred By: Rylee Northeim PA, oliger is surgeon  Past Medical History Relevant to Rehab: healthy, arthritis  Family/Caregiver Present: Yes (spouse)  General Comment: patient wearing sling on arrival.  states he has been takingit off some.  Is right handed. Is now able to shave, and eat with R hand; patient c/o pull and points to deltoid area. States he did have some tingling in 5th right finger once but otherwise no N&T;  has been doing pendulum, elbow AROM, using ball to squeeze, using L arm to reach up with R\"nothing over my head\"; states his elbow is still stiff and doesn't straighten  Precautions:  Precautions  Post-Surgical Precautions: Right shoulder precautions (R reverse shoulder arthoscopy 11/16/23; limit forward flexion and elevation in scapular plane in supine to 120 degmay begin AAROM at 3 weeks restrict til 6 weeks, IR lmited at 6 weeks may begin IR to 50 deg;  may begin strengthening at 6 weeks)    Pain:  Pain Assessment  Pain Assessment: 0-10  Pain Score: 0 - No pain  Discomfort/pain noted at end range stretching of shoulder, elbow and forearm    Prior Level of Function:  Prior Function Per Pt/Caregiver Report  Level of [place]: Independent with ADLs and functional transfers, Independent with homemaking with ambulation  ADL Assistance: Independent  Homemaking Assistance: Independent  Ambulatory Assistance: Independent  Hand Dominance: Right  Prior Function Comments: gardening, camping, fishing, lifting weights ; retired; states he would have pain but still do everything    Objective   Cognition:   WNL  General Assessments:    Extremity/Trunk Assessments:  R shoulder PROM  -flexion/scaption 110deg, abd 72 deg, ER =12deg    R shoulder AAROM "   Supine flex=108deg;   Seated flexion=98, ext=34    L Shoulder AROM:   flex 143, abd 134deg  Sitting osasrwi=[id number], abd=97deg ext=63    R elbow: Lacking 5 deg extension    Palpation: significant tightness in R lateral deltoid, pectoralis, Elbow, forearm,   Decreased joint mobility R elbow and forearm  Decreased scar mobility, tightness in scar    Outcome Measures:  Other Measures  Disability of Arm Shoulder Hand (DASH): 39% impairment     Treatments: (R shoulder)  There ex 36'  Completed and instructed for HEP:  Scapular retractions x10  Shoulder roll with scapular retraction x10  Elbow flex/ext stretch (emphasis elbow extension stretch with weight)  Forearm pron/supination with facilitation/assist,   pron/supi stretch with hammer ins r for HEP    AAROM with wand supine:  [id number]  -[id number]  -abd x10  -ER only (NO IR)x10  Pulleys forward elevation 3'    Manual 21'  Elbow joint mobilization: humeral-ulnar, proximal radial-ulnar, radial distraction  IASTM elbow/forearm  Manual stretching forearm into pron/sup, elbow extension  Lateral deltoid STM, pec STM/manual stretch prior to wand exercises  Scar mobilization and I/S for HEP    EDUCATION:  Outpatient Education  Individual(s) Educated: Patient, Spouse  Education Provided: Home Exercise Program (with handout)  Diagnosis and Precautions: R shoulder stiffness, pain, s/p R reverse shoulder 11/16/23, see above prec  Risk and Benefits Discussed with Patient/Caregiver/Other: yes  Patient/Caregiver Demonstrated Understanding: yes  Plan of Care Discussed and Agreed Upon: yes  Patient Response to Education: Patient/Caregiver Verbalized Understanding of Information, Patient/Caregiver Performed Return Demonstration of Exercises/Activities, Patient/Caregiver Asked Appropriate Questions  Access Code: [email or web address]  URL: [email or web address]  Date: 12/13/2023  Prepared by: Abby Infante    Exercises  - Seated Scapular Retraction  - 4 x daily - 7 x weekly  - 1 sets - 10 reps  - Seated Backward Shoulder Rolls  - 4 x daily - 7 x weekly - 1 sets - 10 reps  - Supine Elbow Extension Stretch with Weight  - 2 x daily - 7 x weekly - 2 sets - 2 reps - 2-3 min hold  - Forearm Pronation and Supination with Hammer  - 1 x daily - 7 x weekly - 3 sets - 10 reps - 10 hold  - Supine Shoulder Flexion Extension AAROM with Dowel  - 2 x daily - 7 x weekly - 1 sets - 10 reps  - Supine Shoulder Abduction AAROM with Dowel  - 2 x daily - 7 x weekly - 1 sets - 10 reps  - Supine Shoulder Press with Dowel  - 2 x daily - 7 x weekly - 1 sets - 10 reps  - Supine Shoulder External Rotation with Dowel  - 2 x daily - 7 x weekly - 1 sets - 10 reps  Goals:  Active       PT Problem       PT Goal 1       Start:  12/13/23    Expected End:  12/27/23       Patient will have AROM right elbow 0-135 deg without compensation/ease and no pain [id number].  Patient will have WNL forearm pronation and supination without pain/discomfort/with ease         PT Goal 2       Start:  12/13/23    Expected End:  01/10/24       -Patient will have improved AAROM R shoulder to 120 elevation (scaption/flexion) with ease and no discomfort in sitting in order to easily feed self, wash hair, brush teeth  -Patient will have WNL shoulder extension in sitting (AROM) without discomfort/with ease to nav/doff jacket  -Patient will have quick DASH score of 25% or less to improve ADL tolerance  -Patient will be able to sleep in bed with only 1 sleep interruption per night x1 week             PT Goal 3       Start:  12/13/23    Expected End:  02/07/24       -Patient will have AROM R shoulder flexion and scaption to 120 deg without pain/discomfort /with ease to perform overhead activities and ADLS  -Patient will be able to nav/doff upper body garments with ease and without discomfort in normal fashion  -Patient will have quick DASH score of 15% or less to improve ADL tolerance  -patient will have no sleep interruptions in bed x1  week         PT Goal 4       Start:  12/13/23    Expected End:  02/07/24       Patient will be independent with HEP within restrictions of protocol to improve ROM, strength, mobility of RUE for ADLS...ongoing

## 2023-12-18 ENCOUNTER — TREATMENT (OUTPATIENT)
Dept: PHYSICAL THERAPY | Facility: CLINIC | Age: 73
End: 2023-12-18
Payer: MEDICARE

## 2023-12-18 DIAGNOSIS — Z96.611 S/P REVERSE TOTAL SHOULDER ARTHROPLASTY, RIGHT: ICD-10-CM

## 2023-12-18 DIAGNOSIS — M25.611 SHOULDER STIFFNESS, RIGHT: ICD-10-CM

## 2023-12-18 DIAGNOSIS — M25.511 ACUTE PAIN OF RIGHT SHOULDER: ICD-10-CM

## 2023-12-18 PROCEDURE — 97110 THERAPEUTIC EXERCISES: CPT | Mod: GP,CQ

## 2023-12-18 PROCEDURE — 97140 MANUAL THERAPY 1/> REGIONS: CPT | Mod: GP,CQ

## 2023-12-18 ASSESSMENT — PAIN - FUNCTIONAL ASSESSMENT: PAIN_FUNCTIONAL_ASSESSMENT: 0-10

## 2023-12-18 ASSESSMENT — PAIN SCALES - GENERAL: PAINLEVEL_OUTOF10: 2

## 2023-12-18 NOTE — PROGRESS NOTES
Physical Therapy Treatment    Patient Name: Dionicio Valdez  MRN: [id number]  Today's Date: 12/18/2023  Time Calculation  Start Time: 0830  Stop Time: 0859  Time Calculation (min): 29 min      Assessment:   Patient appropriately challenged. Reviewed HEP with verbal cues needed to not lean forward during shoulder rolls with patient diana'g good understanding. Patient diana's restriction in R bicep and lateral delt. Patient able to tolerate PROM with verbal cues needed to relax with good understanding.     Plan:  Continue to work on improving ROM to be able to perform household chores with little to no difficulty. -AB.     OP PT Plan  Treatment/Interventions: Cryotherapy, Education/ Instruction, Hot pack, Manual therapy, Neuromuscular re-education, Self care/ home management, Therapeutic exercises (may use IASTM at elbow/forearm, outer shoulder, other areas after 6 weeks; scar mobilization; joint mobilization R forearm/elbow)  PT Plan: Skilled PT  PT Frequency: 2 times per week  Duration:  (8 weeks)  Onset Date: 11/16/23  Rehab Potential: Good  Plan of Care Agreement: Patient    Current Problem  Problem List Items Addressed This Visit             ICD-10-CM    Acute pain of right shoulder M25.511    S/P reverse total shoulder arthroplasty, 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     Other Visit Diagnoses         Codes    Shoulder stiffness, right     M25.611            Subjective   General   Patient states that his pain has gotten better. States that he hasn't been having to take his prescription pain medication, states that tylenol has been working.     Precautions  Precautions  Post-Surgical Precautions: Right shoulder precautions (R reverse shoulder arthoscopy 11/16/23; limit forward flexion and elevation in scapular plane in supine to 120 degmay begin AAROM at 3 weeks restrict til 6 weeks, IR lmited at 6 weeks may begin IR to 50 deg;  may begin strengthening at 6 weeks)    Pain  Pain Assessment: 0-10  Pain Score: 2  Pain Type: Surgical  pain  Pain Location: Shoulder  Pain Orientation: Right      Treatments:  Treatments: (R shoulder)  Therapeutic Exercise: 15 minutes, 1 units  Completed and instructed for HEP:  Scapular retractions x10  Shoulder roll with scapular retraction x10  AAROM with wand supine:  -flexion x15  -press x15  -abd x15  -ER only (NO IR) x15  Pulleys forward elevation 3' (X, not today)    Not today: 12-18-23:   Elbow flex/ext stretch (emphasis elbow extension stretch with weight)  Forearm pron/supination with facilitation/assist,   pron/supi stretch with hammer ins r for HEP        Manual Therapy: 10 minutes, 1 units  Elbow joint mobilization: humeral-ulnar, proximal radial-ulnar, radial distraction (X)   IASTM elbow/forearm (X)  Manual stretching forearm into pron/sup, elbow extension (X)  Lateral deltoid STM, pec STM/manual stretch prior to wand exercises  Scar mobilization and I/S for     OP EDUCATION:  Not today: 12-18-23:   Access Code: [email or web address]  URL: [email or web address]  Date: 12/13/2023  Prepared by: Abby Infante    Exercises  - Seated Scapular Retraction  - 4 x daily - 7 x weekly - 1 sets - 10 reps  - Seated Backward Shoulder Rolls  - 4 x daily - 7 x weekly - 1 sets - 10 reps  - Supine Elbow Extension Stretch with Weight  - 2 x daily - 7 x weekly - 2 sets - 2 reps - 2-3 min hold  - Forearm Pronation and Supination with Hammer  - 1 x daily - 7 x weekly - 3 sets - 10 reps - 10 hold  - Supine Shoulder Flexion Extension AAROM with Dowel  - 2 x daily - 7 x weekly - 1 sets - 10 reps  - Supine Shoulder Abduction AAROM with Dowel  - 2 x daily - 7 x weekly - 1 sets - 10 reps  - Supine Shoulder Press with Dowel  - 2 x daily - 7 x weekly - 1 sets - 10 reps  - Supine Shoulder External Rotation with Dowel  - 2 x daily - 7 x weekly - 1 sets - 10 reps    Goals:  Active       PT Problem       PT Goal 1       Start:  12/13/23    Expected End:  12/27/23       Patient will have AROM right elbow 0-135 deg without  compensation/ease and no pain [id number].  Patient will have WNL forearm pronation and supination without pain/discomfort/with ease         PT Goal 2       Start:  12/13/23    Expected End:  01/10/24       -Patient will have improved AAROM R shoulder to 120 elevation (scaption/flexion) with ease and no discomfort in sitting in order to easily feed self, wash hair, brush teeth  -Patient will have WNL shoulder extension in sitting (AROM) without discomfort/with ease to nav/doff jacket  -Patient will have quick DASH score of 25% or less to improve ADL tolerance  -Patient will be able to sleep in bed with only 1 sleep interruption per night x1 week             PT Goal 3       Start:  12/13/23    Expected End:  02/07/24       -Patient will have AROM R shoulder flexion and scaption to 120 deg without pain/discomfort /with ease to perform overhead activities and ADLS  -Patient will be able to nav/doff upper body garments with ease and without discomfort in normal fashion  -Patient will have quick DASH score of 15% or less to improve ADL tolerance  -patient will have no sleep interruptions in bed x1 week         PT Goal 4       Start:  12/13/23    Expected End:  02/07/24       Patient will be independent with HEP within restrictions of protocol to improve ROM, strength, mobility of RUE for ADLS...ongoing

## 2023-12-20 ENCOUNTER — TREATMENT (OUTPATIENT)
Dept: PHYSICAL THERAPY | Facility: CLINIC | Age: 73
End: 2023-12-20
Payer: MEDICARE

## 2023-12-20 DIAGNOSIS — M25.511 ACUTE PAIN OF RIGHT SHOULDER: ICD-10-CM

## 2023-12-20 DIAGNOSIS — M25.611 SHOULDER STIFFNESS, RIGHT: ICD-10-CM

## 2023-12-20 DIAGNOSIS — Z96.611 S/P REVERSE TOTAL SHOULDER ARTHROPLASTY, RIGHT: ICD-10-CM

## 2023-12-20 PROCEDURE — 97140 MANUAL THERAPY 1/> REGIONS: CPT | Mod: GP,CQ

## 2023-12-20 PROCEDURE — 97110 THERAPEUTIC EXERCISES: CPT | Mod: GP,CQ

## 2023-12-20 ASSESSMENT — PAIN SCALES - GENERAL: PAINLEVEL_OUTOF10: 2

## 2023-12-20 ASSESSMENT — PAIN - FUNCTIONAL ASSESSMENT: PAIN_FUNCTIONAL_ASSESSMENT: 0-10

## 2023-12-20 NOTE — PROGRESS NOTES
Physical Therapy Treatment    Patient Name: Dionicio Valdez  MRN: [id number]  Today's Date: 12/20/2023  Time Calculation  Start Time: 1615  Stop Time: 1658  Time Calculation (min): 43 min      Assessment:  Verbal cues needed with TE, c/o soreness noted with ROM.  End range tension is noted with passive ER.  Pt. C/o bicep area feeling sore and tender with STM.       Plan:  continue with POC per protocol to improve ROM with daily activities with greater ease.  MB     OP PT Plan  Treatment/Interventions: Cryotherapy, Education/ Instruction, Hot pack, Manual therapy, Neuromuscular re-education, Self care/ home management, Therapeutic exercises (may use IASTM at elbow/forearm, outer shoulder, other areas after 6 weeks; scar mobilization; joint mobilization R forearm/elbow)  PT Plan: Skilled PT  PT Frequency: 2 times per week  Duration:  (8 weeks)  Onset Date: 11/16/23  Rehab Potential: Good  Plan of Care Agreement: Patient    Current Problem  Problem List Items Addressed This Visit             ICD-10-CM    Acute pain of right shoulder M25.511    S/P reverse total shoulder arthroplasty, 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 C/o soreness along the bicep and shoulder.  Wearing sling while out shopping d/t the soreness.     Precautions  Precautions  Post-Surgical Precautions: Right shoulder precautions (R reverse shoulder arthoscopy 11/16/23; limit forward flexion and elevation in scapular plane in supine to 120 degmay begin AAROM at 3 weeks restrict til 6 weeks, IR lmited at 6 weeks may begin IR to 50 deg;  may begin strengthening at 6 weeks)    Pain  Pain Assessment: 0-10  Pain Score: 2  Pain Type: Surgical pain  Pain Location: Shoulder  Pain Orientation: Right    Treatments:  TE: 28 mins/2 units   Treatments: (R shoulder)  Completed and instructed for HEP:  Scapular retractions x10  Shoulder roll with scapular retraction x10  AAROM with  wand supine:  -flexion x15  -press x15  -abd x15  -ER only (NO IR) x15  Pulleys forward elevation 3'      Not today: 12-18-23:   Elbow flex/ext stretch (emphasis elbow extension stretch with weight)  Forearm pron/supination with facilitation/assist,   pron/supi stretch with hammer ins r for HEP        Manual Therapy: 12 mins/1 unit   Elbow joint mobilization: humeral-ulnar, proximal radial-ulnar, radial distraction (X)   IASTM elbow/forearm (X)  Manual stretching forearm into pron/sup, elbow extension (X)  Lateral deltoid STM, pec STM/manual stretch prior to wand exercises  Scar mobilization and I/S for     OP EDUCATION:       Goals:  Active       PT Problem       PT Goal 1       Start:  12/13/23    Expected End:  12/27/23       Patient will have AROM right elbow 0-135 deg without compensation/ease and no pain [id number].  Patient will have WNL forearm pronation and supination without pain/discomfort/with ease         PT Goal 2       Start:  12/13/23    Expected End:  01/10/24       -Patient will have improved AAROM R shoulder to 120 elevation (scaption/flexion) with ease and no discomfort in sitting in order to easily feed self, wash hair, brush teeth  -Patient will have WNL shoulder extension in sitting (AROM) without discomfort/with ease to nav/doff jacket  -Patient will have quick DASH score of 25% or less to improve ADL tolerance  -Patient will be able to sleep in bed with only 1 sleep interruption per night x1 week             PT Goal 3       Start:  12/13/23    Expected End:  02/07/24       -Patient will have AROM R shoulder flexion and scaption to 120 deg without pain/discomfort /with ease to perform overhead activities and ADLS  -Patient will be able to nav/doff upper body garments with ease and without discomfort in normal fashion  -Patient will have quick DASH score of 15% or less to improve ADL tolerance  -patient will have no sleep interruptions in bed x1 week         PT Goal 4       Start:   12/13/23    Expected End:  02/07/24       Patient will be independent with HEP within restrictions of protocol to improve ROM, strength, mobility of RUE for ADLS...ongoing

## 2024-01-04 ENCOUNTER — TREATMENT (OUTPATIENT)
Dept: PHYSICAL THERAPY | Facility: CLINIC | Age: 74
End: 2024-01-04
Payer: MEDICARE

## 2024-01-04 DIAGNOSIS — M25.511 ACUTE PAIN OF RIGHT SHOULDER: ICD-10-CM

## 2024-01-04 DIAGNOSIS — M25.611 SHOULDER STIFFNESS, RIGHT: ICD-10-CM

## 2024-01-04 DIAGNOSIS — Z96.611 S/P REVERSE TOTAL SHOULDER ARTHROPLASTY, RIGHT: ICD-10-CM

## 2024-01-04 PROCEDURE — 97140 MANUAL THERAPY 1/> REGIONS: CPT | Mod: GP,CQ

## 2024-01-04 PROCEDURE — 97110 THERAPEUTIC EXERCISES: CPT | Mod: GP,CQ

## 2024-01-04 ASSESSMENT — PAIN - FUNCTIONAL ASSESSMENT: PAIN_FUNCTIONAL_ASSESSMENT: 0-10

## 2024-01-04 ASSESSMENT — PAIN SCALES - GENERAL: PAINLEVEL_OUTOF10: 2

## 2024-01-04 NOTE — PROGRESS NOTES
Physical Therapy Treatment    Patient Name: Dionicio Valdez  MRN: [id number]  Today's Date: 1/4/2024  Time Calculation  Start Time: 0914  Stop Time: 1000  Time Calculation (min): 46 min      Assessment:  Patient identified by name and birth date. IASTM/STM completed to reduce pain and soft tissue restriction in R shoulder musculature. After manual therapy he demo'd improved ROM in all planes per protocol.              Plan:  Continue with manual therapy to reduce soft tissue restriction/pain and ROM per protocol  to allow   improved ease with self care ADLs.-CG          OP PT Plan  Treatment/Interventions: Cryotherapy, Education/ Instruction, Hot pack, Manual therapy, Neuromuscular re-education, Self care/ home management, Therapeutic exercises (may use IASTM at elbow/forearm, outer shoulder, other areas after 6 weeks; scar mobilization; joint mobilization R forearm/elbow)  PT Plan: Skilled PT  PT Frequency: 2 times per week  Duration:  (8 weeks)  Onset Date: 11/16/23  Rehab Potential: Good  Plan of Care Agreement: Patient    Current Problem  Problem List Items Addressed This Visit             ICD-10-CM       Musculoskeletal and Injuries    Acute pain of right shoulder M25.511    S/P reverse total shoulder arthroplasty, 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     Other Visit Diagnoses         Codes    Shoulder stiffness, right     M25.611              Subjective           Precautions  Precautions  Post-Surgical Precautions:  ((R reverse shoulder arthoscopy 11/16/23; limit forward flexion and elevation in scapular plane in supine to 120 degmay begin AAROM at 3 weeks restrict til 6 weeks, IR lmited at 6 weeks may begin IR to 50 deg;  may begin strengthening at 6 weeks))    Pain  Pain Assessment: 0-10  Pain Score: 2  Pain Type: Surgical pain  Pain Location: Shoulder  Pain Orientation: Right    Treatments:  TE: 28 mins/2 units   Treatments: (R shoulder)  Completed and instructed for HEP:  Scapular retractions x10  Shoulder roll with scapular  retraction x10  AAROM with wand supine:  -flexion x15  -press x15  -abd x15  -ER only (NO IR) x15  Pulleys forward elevation 3'      NOT TODAY   Elbow flex/ext stretch (emphasis elbow extension stretch with weight)  Forearm pron/supination with facilitation/assist,   pron/supi stretch with hammer ins r for HEP        Manual Therapy:  Elbow joint mobilization: humeral-ulnar, proximal radial-ulnar, radial distraction (X)   IASTM /STM to R: UT /medial /lateral border  of scap /pecs/ biceps /triceps/delts                               HG9 brush J hook frame Bevel Up 0-30*   Manual stretching forearm into pron/sup, elbow extension (X)  Lateral deltoid STM, pec STM/manual stretch prior to wand exercises  Scar mobilization and I/S     OP EDUCATION:       Goals:  Active       PT Problem       PT Goal 1       Start:  12/13/23    Expected End:  12/27/23       Patient will have AROM right elbow 0-135 deg without compensation/ease and no pain [id number].  Patient will have WNL forearm pronation and supination without pain/discomfort/with ease         PT Goal 2       Start:  12/13/23    Expected End:  01/10/24       -Patient will have improved AAROM R shoulder to 120 elevation (scaption/flexion) with ease and no discomfort in sitting in order to easily feed self, wash hair, brush teeth  -Patient will have WNL shoulder extension in sitting (AROM) without discomfort/with ease to nav/doff jacket  -Patient will have quick DASH score of 25% or less to improve ADL tolerance  -Patient will be able to sleep in bed with only 1 sleep interruption per night x1 week             PT Goal 3       Start:  12/13/23    Expected End:  02/07/24       -Patient will have AROM R shoulder flexion and scaption to 120 deg without pain/discomfort /with ease to perform overhead activities and ADLS  -Patient will be able to nav/doff upper body garments with ease and without discomfort in normal fashion  -Patient will have quick DASH score of 15%  or less to improve ADL tolerance  -patient will have no sleep interruptions in bed x1 week         PT Goal 4       Start:  12/13/23    Expected End:  02/07/24       Patient will be independent with HEP within restrictions of protocol to improve ROM, strength, mobility of RUE for ADLS...ongoing

## 2024-01-08 ENCOUNTER — TREATMENT (OUTPATIENT)
Dept: PHYSICAL THERAPY | Facility: CLINIC | Age: 74
End: 2024-01-08
Payer: MEDICARE

## 2024-01-08 DIAGNOSIS — M25.511 ACUTE PAIN OF RIGHT SHOULDER: ICD-10-CM

## 2024-01-08 DIAGNOSIS — Z96.611 S/P REVERSE TOTAL SHOULDER ARTHROPLASTY, RIGHT: ICD-10-CM

## 2024-01-08 DIAGNOSIS — M25.611 SHOULDER STIFFNESS, RIGHT: ICD-10-CM

## 2024-01-08 PROCEDURE — 97110 THERAPEUTIC EXERCISES: CPT | Mod: GP,CQ

## 2024-01-08 PROCEDURE — 97140 MANUAL THERAPY 1/> REGIONS: CPT | Mod: GP,CQ

## 2024-01-08 ASSESSMENT — PAIN SCALES - GENERAL: PAINLEVEL_OUTOF10: 0 - NO PAIN

## 2024-01-08 ASSESSMENT — PAIN - FUNCTIONAL ASSESSMENT: PAIN_FUNCTIONAL_ASSESSMENT: 0-10

## 2024-01-08 NOTE — PROGRESS NOTES
Physical Therapy Treatment    Patient Name: Dionicio Valdez  MRN: [id number]  Today's Date: 1/8/2024  Time Calculation  Start Time: 1131  Stop Time: 1216  Time Calculation (min): 45 min  Visits:4/10    Assessment:   Patient identified by name and date of birth. Patient was able to progress with shoulder ISO and weight with elbow/wrist per protocol with muscle fatigue and soreness noted. He presented with muscle tension in UT and forarm that responded well to STW and stretching.    Plan:  OP PT Plan  Treatment/Interventions: Cryotherapy, Education/ Instruction, Hot pack, Manual therapy, Neuromuscular re-education, Self care/ home management, Therapeutic exercises (may use IASTM at elbow/forearm, outer shoulder, other areas after 6 weeks; scar mobilization; joint mobilization R forearm/elbow)  PT Plan: Skilled PT  PT Frequency: 2 times per week  Duration:  (8 weeks)  Onset Date: 11/16/23  Rehab Potential: Good  Plan of Care Agreement: Patient    Continue with progression of strengthening and ROM per protocol for increased ease with ADL's. 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 Patient reported 1/10 pain after treatment.     Precautions  Precautions  Post-Surgical Precautions: Right shoulder precautions (R reverse shoulder arthoscopy 11/16/23; limit forward flexion and elevation in scapular plane in supine to 120 degmay begin AAROM at 3 weeks restrict til 6 weeks, IR lmited at 6 weeks may begin IR to 50 deg;  may begin strengthening at 6 weeks)    Pain  Pain Assessment: 0-10  Pain Score: 0 - No pain  Pain Type: Surgical pain     Treatments:  Therapeutic Exercise  Therapeutic Exercise Performed: Yes  Pulleys forward elevation  "3'   Scapular retractions x10 DC to HEP next visit  Shoulder roll with scapular retraction x10 DC to HEP next visit  Elbow flexion 2# 2 x 10   Shoulder iso (N)  -flex,IR,ER x10 3\" Hold   Wrist flex & ext with 2# weight 2 x 10 ea (P)  Seated fwd flex & Scaption x10 ea(N)  AAROM with wand supine:  -flexion x15  -press x15  -abd x15  -ER only (NO IR) x15  UT stretch 2 x 30\" (N)  LS stretch 2 x 30\" (N)     NOT TODAY   Elbow flex/ext stretch (emphasis elbow extension stretch with weight)  Forearm pron/supination with facilitation/assist,   pron/supi stretch with hammer ins r for HEP    Manual Therapy:  Elbow joint mobilization: humeral-ulnar, proximal radial-ulnar, radial distraction (X)   STM to R: UT /medial /lateral border  of scap /pecs/ biceps /triceps/delts and forarm. PROM completed                                 Manual stretching forearm into pron/sup, elbow extension (X)  Lateral deltoid STM, pec STM/manual stretch prior to wand exercises (XP  Scar mobilization and I/S (X)     OP EDUCATION:     Access Code: [email or web address]  URL: [email or web address]  Date: 01/08/2024  Prepared by: Arlene Galeano    Exercises  - Seated Upper Trapezius Stretch  - 1 x daily - 7 x weekly - 1 sets - 3 reps - 20-30 hold  - Gentle Levator Scapulae Stretch  - 1 x daily - 7 x weekly - 1 sets - 3 reps - 20-30 hold  - Standing Isometric Shoulder Internal Rotation at Doorway  - 1 x daily - 7 x weekly - 2 sets - 10 reps - 3-5 hold  - Standing Isometric Shoulder External Rotation with Doorway  - 1 x daily - 7 x weekly - 2 sets - 10 reps - 3-5 hold  - Standing Isometric Shoulder Flexion with Doorway - Arm Bent  - 1 x daily - 7 x weekly - 2 sets - 10 reps - 3-5 hold  - Standing Bicep Curls Supinated with Dumbbells  - 1 x daily - 7 x weekly - 2 sets - 10 reps  - Seated Wrist Flexion with Dumbbell  - 1 x daily - 7 x weekly - 2 sets - 10 reps  - Seated Wrist Extension with Dumbbell  - 1 x daily - 7 x weekly - 2 sets - 10 " reps  Not today: 12-18-23:   Access Code: [email or web address]  URL: [email or web address]  Date: 12/13/2023  Prepared by: Abby Infante     Exercises  - Seated Scapular Retraction  - 4 x daily - 7 x weekly - 1 sets - 10 reps  - Seated Backward Shoulder Rolls  - 4 x daily - 7 x weekly - 1 sets - 10 reps  - Supine Elbow Extension Stretch with Weight  - 2 x daily - 7 x weekly - 2 sets - 2 reps - 2-3 min hold  - Forearm Pronation and Supination with Hammer  - 1 x daily - 7 x weekly - 3 sets - 10 reps - 10 hold  - Supine Shoulder Flexion Extension AAROM with Dowel  - 2 x daily - 7 x weekly - 1 sets - 10 reps  - Supine Shoulder Abduction AAROM with Dowel  - 2 x daily - 7 x weekly - 1 sets - 10 reps  - Supine Shoulder Press with Dowel  - 2 x daily - 7 x weekly - 1 sets - 10 reps  - Supine Shoulder External Rotation with Dowel  - 2 x daily - 7 x weekly - 1 sets - 10 reps    Goals:  Active       PT Problem       PT Goal 1       Start:  12/13/23    Expected End:  12/27/23       Patient will have AROM right elbow 0-135 deg without compensation/ease and no pain [id number].  Patient will have WNL forearm pronation and supination without pain/discomfort/with ease         PT Goal 2       Start:  12/13/23    Expected End:  01/10/24       -Patient will have improved AAROM R shoulder to 120 elevation (scaption/flexion) with ease and no discomfort in sitting in order to easily feed self, wash hair, brush teeth  -Patient will have WNL shoulder extension in sitting (AROM) without discomfort/with ease to nav/doff jacket  -Patient will have quick DASH score of 25% or less to improve ADL tolerance  -Patient will be able to sleep in bed with only 1 sleep interruption per night x1 week             PT Goal 3       Start:  12/13/23    Expected End:  02/07/24       -Patient will have AROM R shoulder flexion and scaption to 120 deg without pain/discomfort /with ease to perform overhead activities and ADLS  -Patient will be  able to nav/doff upper body garments with ease and without discomfort in normal fashion  -Patient will have quick DASH score of 15% or less to improve ADL tolerance  -patient will have no sleep interruptions in bed x1 week         PT Goal 4       Start:  12/13/23    Expected End:  02/07/24       Patient will be independent with HEP within restrictions of protocol to improve ROM, strength, mobility of RUE for ADLS...ongoing

## 2024-01-10 ENCOUNTER — ANCILLARY PROCEDURE (OUTPATIENT)
Dept: RADIOLOGY | Facility: CLINIC | Age: 74
End: 2024-01-10
Payer: MEDICARE

## 2024-01-10 DIAGNOSIS — M25.511 ACUTE PAIN OF RIGHT SHOULDER: ICD-10-CM

## 2024-01-10 PROCEDURE — 73030 X-RAY EXAM OF SHOULDER: CPT | Mod: RIGHT SIDE | Performed by: RADIOLOGY

## 2024-01-10 PROCEDURE — 73030 X-RAY EXAM OF SHOULDER: CPT | Mod: RT

## 2024-01-12 ENCOUNTER — APPOINTMENT (OUTPATIENT)
Dept: ORTHOPEDIC SURGERY | Facility: CLINIC | Age: 74
End: 2024-01-12
Payer: MEDICARE

## 2024-01-12 ENCOUNTER — TREATMENT (OUTPATIENT)
Dept: PHYSICAL THERAPY | Facility: CLINIC | Age: 74
End: 2024-01-12
Payer: MEDICARE

## 2024-01-12 DIAGNOSIS — Z96.611 S/P REVERSE TOTAL SHOULDER ARTHROPLASTY, RIGHT: ICD-10-CM

## 2024-01-12 DIAGNOSIS — M25.611 SHOULDER STIFFNESS, RIGHT: ICD-10-CM

## 2024-01-12 DIAGNOSIS — M25.511 ACUTE PAIN OF RIGHT SHOULDER: ICD-10-CM

## 2024-01-12 PROCEDURE — 97110 THERAPEUTIC EXERCISES: CPT | Mod: GP,CQ

## 2024-01-12 PROCEDURE — 97140 MANUAL THERAPY 1/> REGIONS: CPT | Mod: GP,CQ

## 2024-01-12 ASSESSMENT — PAIN SCALES - GENERAL: PAINLEVEL_OUTOF10: 0 - NO PAIN

## 2024-01-12 ASSESSMENT — PAIN - FUNCTIONAL ASSESSMENT: PAIN_FUNCTIONAL_ASSESSMENT: 0-10

## 2024-01-12 NOTE — PROGRESS NOTES
Physical Therapy Treatment    Patient Name: Dionicio Valdez  MRN: [id number]  Today's Date: 1/12/2024  Time Calculation  Start Time: 1445  Stop Time: 1530  Time Calculation (min): 45 min  Visits: 5/10    Assessment:   Patient identified by name and date of birth. Patient demonstrated good tolerance to treatment with muscle fatigue noted at end of treatment. He presented with palpable tension in pec that responded well to STW.   Plan:  OP PT Plan  Treatment/Interventions: Cryotherapy, Education/ Instruction, Hot pack, Manual therapy, Neuromuscular re-education, Self care/ home management, Therapeutic exercises (may use IASTM at elbow/forearm, outer shoulder, other areas after 6 weeks; scar mobilization; joint mobilization R forearm/elbow)  PT Plan: Skilled PT  PT Frequency: 2 times per week  Duration:  (8 weeks)  Onset Date: 11/16/23  Rehab Potential: Good  Plan of Care Agreement: Patient  Progress with strengthening and ROM per protocol for increased ease with ADL's. 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  He reported no pain after treatment.     Precautions  Precautions  Post-Surgical Precautions: Right shoulder precautions (R reverse shoulder arthoscopy 11/16/23; limit forward flexion and elevation in scapular plane in supine to 120 degmay begin AAROM at 3 weeks restrict til 6 weeks, IR lmited at 6 weeks may begin IR to 50 deg;  may begin strengthening at 6 weeks)    Pain  Pain Assessment: 0-10  Pain Score: 0 - No pain  Pain Type: Surgical pain     Treatments:  Therapeutic Exercise  Therapeutic Exercise Performed: Yes  Pulleys forward elevation 3'   UBE 2'fwd 2' bwd (N)  Elbow flexion 2# 2 x  "10   Shoulder iso   -flex,IR,ER x10 5\" Hold   Wrist flex & ext with 2# weight 2 x 10 ea   Seated fwd flex & Scaption 2x10 ea   AAROM with wand supine:(X)  -flexion x15  -press x15  -abd x15  -ER only (NO IR) x15  UT stretch 2 x 30\" (X)  LS stretch 2 x 30\" (X)  Scapular retractions x10 DC to HEP next visit  Shoulder roll with scapular retraction x10 DC to HEP next visit     NOT TODAY   Elbow flex/ext stretch (emphasis elbow extension stretch with weight)  Forearm pron/supination with facilitation/assist,   pron/supi stretch with hammer ins r for HEP  Manual Therapy  Manual Therapy Performed: Yes   Elbow joint mobilization: humeral-ulnar, proximal radial-ulnar, radial distraction (X)   STM to R: UT /medial /lateral border  of scap /pecs/ biceps /triceps/delts and forarm. PROM completed                                 Manual stretching forearm into pron/sup, elbow extension (X)  Lateral deltoid STM, pec STM/manual stretch prior to wand exercises (XP  Scar mobilization and I/S (X)  OP EDUCATION:   Access Code: [email or web address]  URL: [email or web address]  Date: 01/08/2024  Prepared by: Arlene Galeano     Exercises  - Seated Upper Trapezius Stretch  - 1 x daily - 7 x weekly - 1 sets - 3 reps - 20-30 hold  - Gentle Levator Scapulae Stretch  - 1 x daily - 7 x weekly - 1 sets - 3 reps - 20-30 hold  - Standing Isometric Shoulder Internal Rotation at Doorway  - 1 x daily - 7 x weekly - 2 sets - 10 reps - 3-5 hold  - Standing Isometric Shoulder External Rotation with Doorway  - 1 x daily - 7 x weekly - 2 sets - 10 reps - 3-5 hold  - Standing Isometric Shoulder Flexion with Doorway - Arm Bent  - 1 x daily - 7 x weekly - 2 sets - 10 reps - 3-5 hold  - Standing Bicep Curls Supinated with Dumbbells  - 1 x daily - 7 x weekly - 2 sets - 10 reps  - Seated Wrist Flexion with Dumbbell  - 1 x daily - 7 x weekly - 2 sets - 10 reps  - Seated Wrist Extension with Dumbbell  - 1 x daily - 7 x weekly - 2 sets - 10 reps  Not " today: 12-18-23:   Access Code: [email or web address]  URL: [email or web address]  Date: 12/13/2023  Prepared by: Abby Infante     Exercises  - Seated Scapular Retraction  - 4 x daily - 7 x weekly - 1 sets - 10 reps  - Seated Backward Shoulder Rolls  - 4 x daily - 7 x weekly - 1 sets - 10 reps  - Supine Elbow Extension Stretch with Weight  - 2 x daily - 7 x weekly - 2 sets - 2 reps - 2-3 min hold  - Forearm Pronation and Supination with Hammer  - 1 x daily - 7 x weekly - 3 sets - 10 reps - 10 hold  - Supine Shoulder Flexion Extension AAROM with Dowel  - 2 x daily - 7 x weekly - 1 sets - 10 reps  - Supine Shoulder Abduction AAROM with Dowel  - 2 x daily - 7 x weekly - 1 sets - 10 reps  - Supine Shoulder Press with Dowel  - 2 x daily - 7 x weekly - 1 sets - 10 reps  - Supine Shoulder External Rotation with Dowel  - 2 x daily - 7 x weekly - 1 sets - 10 reps       Goals:  Active       PT Problem       PT Goal 1       Start:  12/13/23    Expected End:  12/27/23       Patient will have AROM right elbow 0-135 deg without compensation/ease and no pain [id number].  Patient will have WNL forearm pronation and supination without pain/discomfort/with ease         PT Goal 2       Start:  12/13/23    Expected End:  01/10/24       -Patient will have improved AAROM R shoulder to 120 elevation (scaption/flexion) with ease and no discomfort in sitting in order to easily feed self, wash hair, brush teeth  -Patient will have WNL shoulder extension in sitting (AROM) without discomfort/with ease to nav/doff jacket  -Patient will have quick DASH score of 25% or less to improve ADL tolerance  -Patient will be able to sleep in bed with only 1 sleep interruption per night x1 week             PT Goal 3       Start:  12/13/23    Expected End:  02/07/24       -Patient will have AROM R shoulder flexion and scaption to 120 deg without pain/discomfort /with ease to perform overhead activities and ADLS  -Patient will be able to  nav/doff upper body garments with ease and without discomfort in normal fashion  -Patient will have quick DASH score of 15% or less to improve ADL tolerance  -patient will have no sleep interruptions in bed x1 week         PT Goal 4       Start:  12/13/23    Expected End:  02/07/24       Patient will be independent with HEP within restrictions of protocol to improve ROM, strength, mobility of RUE for ADLS...ongoing

## 2024-01-15 ENCOUNTER — TREATMENT (OUTPATIENT)
Dept: PHYSICAL THERAPY | Facility: CLINIC | Age: 74
End: 2024-01-15
Payer: MEDICARE

## 2024-01-15 DIAGNOSIS — Z96.611 S/P REVERSE TOTAL SHOULDER ARTHROPLASTY, RIGHT: ICD-10-CM

## 2024-01-15 DIAGNOSIS — M25.611 SHOULDER STIFFNESS, RIGHT: ICD-10-CM

## 2024-01-15 DIAGNOSIS — M25.511 ACUTE PAIN OF RIGHT SHOULDER: ICD-10-CM

## 2024-01-15 PROCEDURE — 97110 THERAPEUTIC EXERCISES: CPT | Mod: GP,CQ

## 2024-01-15 PROCEDURE — 97140 MANUAL THERAPY 1/> REGIONS: CPT | Mod: GP,CQ

## 2024-01-15 ASSESSMENT — PAIN SCALES - GENERAL: PAINLEVEL_OUTOF10: 1

## 2024-01-15 ASSESSMENT — PAIN - FUNCTIONAL ASSESSMENT: PAIN_FUNCTIONAL_ASSESSMENT: 0-10

## 2024-01-15 NOTE — PROGRESS NOTES
Physical Therapy Treatment    Patient Name: iDonicio Valdez  MRN: [id number]  Today's Date: 1/15/2024  Time Calculation  Start Time: 1045  Stop Time: 1129  Time Calculation (min): 44 min    Assessment:   Patient identified by name and date of birth.  Patient demonstrated good understanding and tolerance of treatment. He was able to progress with weight per protocol with fatigue and no pain noted. He presented with muscle tension in UT and firm end feel with PROM.    Plan:  OP PT Plan  Treatment/Interventions: Cryotherapy, Education/ Instruction, Hot pack, Manual therapy, Neuromuscular re-education, Self care/ home management, Therapeutic exercises (may use IASTM at elbow/forearm, outer shoulder, other areas after 6 weeks; scar mobilization; joint mobilization R forearm/elbow)  PT Plan: Skilled PT  PT Frequency: 2 times per week  Duration:  (8 weeks)  Onset Date: 11/16/23  Rehab Potential: Good  Plan of Care Agreement: Patient  Continue with progression of strengthening and ROM per protocol for increased ease with feeding and donning off and on coat.AW  Current Problem  Problem List Items Addressed This Visit             ICD-10-CM    Acute pain of right shoulder M25.511    S/P reverse total shoulder arthroplasty, 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  Patient reported muscle soreness without pain after treatment.     General  General Comment: 7/10  Precautions  Precautions  Post-Surgical Precautions: Right shoulder precautions (R reverse shoulder arthoscopy 11/16/23; limit forward flexion and elevation in scapular plane in supine to 120 degmay begin AAROM at 3 weeks restrict til 6 weeks, IR lmited at 6 weeks may begin IR to 50 deg;  may begin strengthening at 6 weeks)    Pain  Pain Assessment: 0-10  Pain Score: 1  Pain Type: Surgical pain      "Treatments:  Therapeutic Exercise  Therapeutic Exercise Performed: Yes  Pulleys forward elevation 3'   UBE 3'fwd 3' bwd (N)  Shoulder iso   -flex,IR,ER x10 5\" Hold   Elbow flexion 4# 2 x 10 (P)  Wrist flex & ext and Unar with 3# weight 2 x 10 ea   Seated fwd flex & Scaption 1# 2x10 ea   AAROM with wand supine:(X)  -flexion x15  -press x15  -abd x15  -ER only (NO IR) x15  UT stretch 2 x 30\" (X)  LS stretch 2 x 30\" (X)  Scapular retractions x10 DC to HEP next visit  Shoulder roll with scapular retraction x10 DC to HEP next visit     NOT TODAY   Elbow flex/ext stretch (emphasis elbow extension stretch with weight)  Forearm pron/supination with facilitation/assist,   pron/supi stretch with hammer ins r for HEP  Manual Therapy  Manual Therapy Performed: Yes   Elbow joint mobilization: humeral-ulnar, proximal radial-ulnar, radial distraction (X)   STM to R: UT /medial /lateral border  of scap /pecs/ biceps /triceps/delts and forarm. PROM completed     Manual Therapy  Manual Therapy Performed: Yes   Lateral deltoid STM, pec STM/manual stretch prior to wand exercises (XP  Scar mobilization and I/S (X)  OP EDUCATION:   Access Code: [email or web address]  URL: [email or web address]  Date: 01/08/2024  Prepared by: Arlene Galeano     Exercises  - Seated Upper Trapezius Stretch  - 1 x daily - 7 x weekly - 1 sets - 3 reps - 20-30 hold  - Gentle Levator Scapulae Stretch  - 1 x daily - 7 x weekly - 1 sets - 3 reps - 20-30 hold  - Standing Isometric Shoulder Internal Rotation at Doorway  - 1 x daily - 7 x weekly - 2 sets - 10 reps - 3-5 hold  - Standing Isometric Shoulder External Rotation with Doorway  - 1 x daily - 7 x weekly - 2 sets - 10 reps - 3-5 hold  - Standing Isometric Shoulder Flexion with Doorway - Arm Bent  - 1 x daily - 7 x weekly - 2 sets - 10 reps - 3-5 hold  - Standing Bicep Curls Supinated with Dumbbells  - 1 x daily - 7 x weekly - 2 sets - 10 reps  - Seated Wrist Flexion with Dumbbell  - 1 x daily - 7 x " weekly - 2 sets - 10 reps  - Seated Wrist Extension with Dumbbell  - 1 x daily - 7 x weekly - 2 sets - 10 reps  Not today: 12-18-23:   Access Code: [email or web address]  URL: [email or web address]  Date: 12/13/2023  Prepared by: Abby Infante     Exercises  - Seated Scapular Retraction  - 4 x daily - 7 x weekly - 1 sets - 10 reps  - Seated Backward Shoulder Rolls  - 4 x daily - 7 x weekly - 1 sets - 10 reps  - Supine Elbow Extension Stretch with Weight  - 2 x daily - 7 x weekly - 2 sets - 2 reps - 2-3 min hold  - Forearm Pronation and Supination with Hammer  - 1 x daily - 7 x weekly - 3 sets - 10 reps - 10 hold  - Supine Shoulder Flexion Extension AAROM with Dowel  - 2 x daily - 7 x weekly - 1 sets - 10 reps  - Supine Shoulder Abduction AAROM with Dowel  - 2 x daily - 7 x weekly - 1 sets - 10 reps  - Supine Shoulder Press with Dowel  - 2 x daily - 7 x weekly - 1 sets - 10 reps  - Supine Shoulder External Rotation with Dowel  - 2 x daily - 7 x weekly - 1 sets - 10 reps    Goals:  Active       PT Problem       PT Goal 1       Start:  12/13/23    Expected End:  12/27/23       Patient will have AROM right elbow 0-135 deg without compensation/ease and no pain [id number].  Patient will have WNL forearm pronation and supination without pain/discomfort/with ease         PT Goal 2       Start:  12/13/23    Expected End:  01/10/24       -Patient will have improved AAROM R shoulder to 120 elevation (scaption/flexion) with ease and no discomfort in sitting in order to easily feed self, wash hair, brush teeth  -Patient will have WNL shoulder extension in sitting (AROM) without discomfort/with ease to nav/doff jacket  -Patient will have quick DASH score of 25% or less to improve ADL tolerance  -Patient will be able to sleep in bed with only 1 sleep interruption per night x1 week             PT Goal 3       Start:  12/13/23    Expected End:  02/07/24       -Patient will have AROM R shoulder flexion and  scaption to 120 deg without pain/discomfort /with ease to perform overhead activities and ADLS  -Patient will be able to nav/doff upper body garments with ease and without discomfort in normal fashion  -Patient will have quick DASH score of 15% or less to improve ADL tolerance  -patient will have no sleep interruptions in bed x1 week         PT Goal 4       Start:  12/13/23    Expected End:  02/07/24       Patient will be independent with HEP within restrictions of protocol to improve ROM, strength, mobility of RUE for ADLS...ongoing

## 2024-01-17 ENCOUNTER — OFFICE VISIT (OUTPATIENT)
Dept: ORTHOPEDIC SURGERY | Facility: CLINIC | Age: 74
End: 2024-01-17
Payer: MEDICARE

## 2024-01-17 ENCOUNTER — APPOINTMENT (OUTPATIENT)
Dept: ORTHOPEDIC SURGERY | Facility: CLINIC | Age: 74
End: 2024-01-17
Payer: MEDICARE

## 2024-01-17 DIAGNOSIS — Z96.611 S/P REVERSE TOTAL SHOULDER ARTHROPLASTY, RIGHT: Primary | ICD-10-CM

## 2024-01-17 PROCEDURE — 99024 POSTOP FOLLOW-UP VISIT: CPT | Performed by: STUDENT IN AN ORGANIZED HEALTH CARE EDUCATION/TRAINING PROGRAM

## 2024-01-17 PROCEDURE — 1125F AMNT PAIN NOTED PAIN PRSNT: CPT | Performed by: STUDENT IN AN ORGANIZED HEALTH CARE EDUCATION/TRAINING PROGRAM

## 2024-01-17 PROCEDURE — 1160F RVW MEDS BY RX/DR IN RCRD: CPT | Performed by: STUDENT IN AN ORGANIZED HEALTH CARE EDUCATION/TRAINING PROGRAM

## 2024-01-17 PROCEDURE — 1036F TOBACCO NON-USER: CPT | Performed by: STUDENT IN AN ORGANIZED HEALTH CARE EDUCATION/TRAINING PROGRAM

## 2024-01-17 ASSESSMENT — PAIN SCALES - GENERAL: PAINLEVEL_OUTOF10: 1

## 2024-01-17 ASSESSMENT — PAIN - FUNCTIONAL ASSESSMENT: PAIN_FUNCTIONAL_ASSESSMENT: 0-10

## 2024-01-17 NOTE — PROGRESS NOTES
Chief Complaint   Patient presents with    Right Shoulder - Post-op     Right Reverse Total Shoulder  11/16/2023     History of Present Illness  Patient returns today after shoulder arthroplasty.  Pain is improving.   Denies any numbness tingling or shortness of breath. Pain is appropriate for post-op course.     Minimal if any pain today very happy with his progress    Exam  Mild swelling  Incision well healed  Normal sensation over deltoid  Passive forward flexion 150 degrees, Active forward flexion 150 degrees, internal rotation lower lumbar spine  Neurovascular exam normal distally    Multiple views right shoulder: Status post reverse shoulder arthroplasty appropriate placement of hardware.     Assessment  Patient status post right reverse total shoulder arthroplasty     Plan  Continue physical therapy  Discussed  activity restrictions.  Follow-up 8weeks  XR at follow up  3views Shoulder Grashey, Axillary, Scap Y     Doing very well.  Very happy with his progress.  Discussed activities to avoid and that full recovery can take 1 year.

## 2024-01-19 ENCOUNTER — APPOINTMENT (OUTPATIENT)
Dept: PHYSICAL THERAPY | Facility: CLINIC | Age: 74
End: 2024-01-19
Payer: MEDICARE

## 2024-01-22 ENCOUNTER — TREATMENT (OUTPATIENT)
Dept: PHYSICAL THERAPY | Facility: CLINIC | Age: 74
End: 2024-01-22
Payer: MEDICARE

## 2024-01-22 DIAGNOSIS — Z96.611 S/P REVERSE TOTAL SHOULDER ARTHROPLASTY, RIGHT: ICD-10-CM

## 2024-01-22 DIAGNOSIS — M25.511 ACUTE PAIN OF RIGHT SHOULDER: ICD-10-CM

## 2024-01-22 DIAGNOSIS — M25.611 SHOULDER STIFFNESS, RIGHT: ICD-10-CM

## 2024-01-22 PROCEDURE — 97140 MANUAL THERAPY 1/> REGIONS: CPT | Mod: GP,CQ

## 2024-01-22 PROCEDURE — 97110 THERAPEUTIC EXERCISES: CPT | Mod: GP,CQ

## 2024-01-22 ASSESSMENT — PAIN - FUNCTIONAL ASSESSMENT: PAIN_FUNCTIONAL_ASSESSMENT: 0-10

## 2024-01-22 ASSESSMENT — PAIN SCALES - GENERAL: PAINLEVEL_OUTOF10: 2

## 2024-01-22 NOTE — PROGRESS NOTES
Physical Therapy Treatment    Patient Name: Dionicio Valdez  MRN: [id number]  Today's Date: 1/22/2024  Time Calculation  Start Time: 1045  Stop Time: 1130  Time Calculation (min): 45 min    Assessment:   Patient identified by name and date of birth. Patient was able to progress with use of t-band walk outs and progression of weight. He presented with tension around scap this date instructed with use of tennis ball for HEP he demonstrated good understanding.     Plan:  OP PT Plan  Treatment/Interventions: Cryotherapy, Education/ Instruction, Hot pack, Manual therapy, Neuromuscular re-education, Self care/ home management, Therapeutic exercises (may use IASTM at elbow/forearm, outer shoulder, other areas after 6 weeks; scar mobilization; joint mobilization R forearm/elbow)  PT Plan: Skilled PT  PT Frequency: 2 times per week  Duration:  (8 weeks)  Onset Date: 11/16/23  Rehab Potential: Good  Plan of Care Agreement: Patient    Current Problem  Problem List Items Addressed This Visit             ICD-10-CM    Acute pain of right shoulder M25.511    S/P reverse total shoulder arthroplasty, 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 Patient reported compliance with % of the time and verbalized understanding.  Patient reported 1/10 pain after treatment.   General  General Comment: 8/10  Precautions  Precautions  Post-Surgical Precautions: Right shoulder precautions (R reverse shoulder arthoscopy 11/16/23; limit forward flexion and elevation in scapular plane in supine to 120 degmay begin AAROM at 3 weeks restrict til 6 weeks, IR lmited at 6 weeks may begin IR to 50 deg;  may begin strengthening at 6 weeks)    Pain  Pain Assessment: 0-10  Pain Score: 2  Pain Type: Surgical pain  Pain Location: Shoulder  Pain Orientation:  "Right     Treatments:  Therapeutic Exercise  Therapeutic Exercise Performed: Yes  Pulleys forward elevation 3'   UBE 3'fwd 3' bwd   T-band walk outs   -ER, IR, flex, ext green band 2 x 10 ea   Wall slide flex/scap x10 10\" hold (N)  Elbow flexion 5# 2 x 10 (P)  Seated fwd flex 2# 2 x 10   Seated Scaption 1# 2x10 ea     NOT TODAY  Wrist flex & ext and Unar with 3# weight 2 x 10 ea (X)  AAROM with wand supine:(HEP)  -flexion x15  -press x15  -abd x15  -ER only (NO IR) x15  UT stretch 2 x 30\" (X)  LS stretch 2 x 30\" (X)  Scapular retractions x10 DC to HEP next visit  Shoulder roll with scapular retraction x10 DC to HEP next visit   Shoulder iso (X)  -flex,IR,ER x10 5\" Hold    Elbow flex/ext stretch (emphasis elbow extension stretch with weight)  Forearm pron/supination with facilitation/assist,   pron/supi stretch with hammer ins r for HEP    Manual Therapy  Manual Therapy Performed: Yes   Lateral deltoid STM, pec STM/manual stretch prior to wand exercises (XP  Scar mobilization and I/S (X)  OP EDUCATION:    Access Code: [email or web address]  URL: [email or web address]  Date: 01/08/2024  Prepared by: Arlene Galeano     Exercises  - Seated Upper Trapezius Stretch  - 1 x daily - 7 x weekly - 1 sets - 3 reps - 20-30 hold  - Gentle Levator Scapulae Stretch  - 1 x daily - 7 x weekly - 1 sets - 3 reps - 20-30 hold  - Standing Isometric Shoulder Internal Rotation at Doorway  - 1 x daily - 7 x weekly - 2 sets - 10 reps - 3-5 hold  - Standing Isometric Shoulder External Rotation with Doorway  - 1 x daily - 7 x weekly - 2 sets - 10 reps - 3-5 hold  - Standing Isometric Shoulder Flexion with Doorway - Arm Bent  - 1 x daily - 7 x weekly - 2 sets - 10 reps - 3-5 hold  - Standing Bicep Curls Supinated with Dumbbells  - 1 x daily - 7 x weekly - 2 sets - 10 reps  - Seated Wrist Flexion with Dumbbell  - 1 x daily - 7 x weekly - 2 sets - 10 reps  - Seated Wrist Extension with Dumbbell  - 1 x daily - 7 x weekly - 2 sets - 10 " reps  Not today: 12-18-23:   Access Code: [email or web address]  URL: [email or web address]  Date: 12/13/2023  Prepared by: Abby Infante     Exercises  - Seated Scapular Retraction  - 4 x daily - 7 x weekly - 1 sets - 10 reps  - Seated Backward Shoulder Rolls  - 4 x daily - 7 x weekly - 1 sets - 10 reps  - Supine Elbow Extension Stretch with Weight  - 2 x daily - 7 x weekly - 2 sets - 2 reps - 2-3 min hold  - Forearm Pronation and Supination with Hammer  - 1 x daily - 7 x weekly - 3 sets - 10 reps - 10 hold  - Supine Shoulder Flexion Extension AAROM with Dowel  - 2 x daily - 7 x weekly - 1 sets - 10 reps  - Supine Shoulder Abduction AAROM with Dowel  - 2 x daily - 7 x weekly - 1 sets - 10 reps  - Supine Shoulder Press with Dowel  - 2 x daily - 7 x weekly - 1 sets - 10 reps  - Supine Shoulder External Rotation with Dowel  - 2 x daily - 7 x weekly - 1 sets - 10 reps    Goals:  Active       PT Problem       PT Goal 1       Start:  12/13/23    Expected End:  12/27/23       Patient will have AROM right elbow 0-135 deg without compensation/ease and no pain [id number].  Patient will have WNL forearm pronation and supination without pain/discomfort/with ease         PT Goal 2       Start:  12/13/23    Expected End:  01/10/24       -Patient will have improved AAROM R shoulder to 120 elevation (scaption/flexion) with ease and no discomfort in sitting in order to easily feed self, wash hair, brush teeth  -Patient will have WNL shoulder extension in sitting (AROM) without discomfort/with ease to nav/doff jacket  -Patient will have quick DASH score of 25% or less to improve ADL tolerance  -Patient will be able to sleep in bed with only 1 sleep interruption per night x1 week             PT Goal 3       Start:  12/13/23    Expected End:  02/07/24       -Patient will have AROM R shoulder flexion and scaption to 120 deg without pain/discomfort /with ease to perform overhead activities and ADLS  -Patient will be  able to nav/doff upper body garments with ease and without discomfort in normal fashion  -Patient will have quick DASH score of 15% or less to improve ADL tolerance  -patient will have no sleep interruptions in bed x1 week         PT Goal 4       Start:  12/13/23    Expected End:  02/07/24       Patient will be independent with HEP within restrictions of protocol to improve ROM, strength, mobility of RUE for ADLS...ongoing

## 2024-01-26 ENCOUNTER — TREATMENT (OUTPATIENT)
Dept: PHYSICAL THERAPY | Facility: CLINIC | Age: 74
End: 2024-01-26
Payer: MEDICARE

## 2024-01-26 DIAGNOSIS — Z96.611 S/P REVERSE TOTAL SHOULDER ARTHROPLASTY, RIGHT: ICD-10-CM

## 2024-01-26 DIAGNOSIS — M25.611 SHOULDER STIFFNESS, RIGHT: ICD-10-CM

## 2024-01-26 DIAGNOSIS — M25.511 ACUTE PAIN OF RIGHT SHOULDER: ICD-10-CM

## 2024-01-26 PROCEDURE — 97110 THERAPEUTIC EXERCISES: CPT | Mod: GP,CQ

## 2024-01-26 PROCEDURE — 97140 MANUAL THERAPY 1/> REGIONS: CPT | Mod: GP,CQ

## 2024-01-26 ASSESSMENT — PAIN - FUNCTIONAL ASSESSMENT: PAIN_FUNCTIONAL_ASSESSMENT: 0-10

## 2024-01-26 ASSESSMENT — PAIN SCALES - GENERAL: PAINLEVEL_OUTOF10: 1

## 2024-01-26 NOTE — PROGRESS NOTES
Physical Therapy Treatment    Patient Name: Dionicio Valdez  MRN: [id number]  Today's Date: 1/26/2024  Time Calculation  Start Time: 1225  Stop Time: 1315  Time Calculation (min): 50 min    Assessment:   Patient identified by name and date of birth. Patient demonstrated good understanding of HEP with review and new handouts. He demonstrated improved range and PROM and addition of scap mobs and STW.     Plan:  OP PT Plan  Treatment/Interventions: Cryotherapy, Education/ Instruction, Hot pack, Manual therapy, Neuromuscular re-education, Self care/ home management, Therapeutic exercises (may use IASTM at elbow/forearm, outer shoulder, other areas after 6 weeks; scar mobilization; joint mobilization R forearm/elbow)  PT Plan: Skilled PT  PT Frequency: 2 times per week  Duration:  (8 weeks)  Onset Date: 11/16/23  Rehab Potential: Good  Plan of Care Agreement: Patient    Patient had re-check with PT next visit.   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 Patient reported 1/10 pain and soreness after treatment.   General  General Comment: 9/10  Precautions  Precautions  Post-Surgical Precautions: Right shoulder precautions (R reverse shoulder arthoscopy 11/16/23; limit forward flexion and elevation in scapular plane in supine to 120 degmay begin AAROM at 3 weeks restrict til 6 weeks, IR lmited at 6 weeks may begin IR to 50 deg;  may begin strengthening at 6 weeks)    Pain  Pain Assessment: 0-10  Pain Score: 1  Pain Type: Surgical pain  Pain Location: Shoulder  Pain Orientation: Right     Treatments:  Therapeutic Exercise  Therapeutic Exercise Performed: Yes  Pulleys forward elevation 3'   UBE 3'fwd 3' bwd  "  T-band walk outs   -ER, IR, flex, ext green band 2 x 10 ea   Wall slide flex/scap x10 10\" hold (N)  Elbow flexion 5# 2 x 10   Seated fwd flex 2# 2 x 10   Seated Scaption 1# 2x10 ea   S/L ER 2 x 10 0# (N)     NOT TODAY  Wrist flex & ext and Unar with 3# weight 2 x 10 ea (X)  AAROM with wand supine:(HEP)  -flexion x15  -press x15  -abd x15  -ER only (NO IR) x15  UT stretch 2 x 30\" (X)  LS stretch 2 x 30\" (X)  Scapular retractions x10 DC to HEP next visit  Shoulder roll with scapular retraction x10 DC to HEP next visit   Shoulder iso (X)  -flex,IR,ER x10 5\" Hold    Elbow flex/ext stretch (emphasis elbow extension stretch with weight)  Forearm pron/supination with facilitation/assist,   pron/supi stretch with hammer ins r for HEP    Manual Therapy  Manual Therapy Performed: Yes  Lateral deltoid STM, pec STM/manual stretch   Scar mobilization and I/S   PROM completed.   Scap mobs completed (N)    OP EDUCATION:     Access Code: [email or web address]  URL: [email or web address]  Date: 01/26/2024  Prepared by: Arlene Galeano    Exercises  - Shoulder External Rotation Reactive Isometrics  - 1 x daily - 7 x weekly - 2 sets - 10 reps  - Shoulder Internal Rotation Reactive Isometrics  - 1 x daily - 7 x weekly - 2 sets - 10 reps  - Standing Shoulder Flexion Reactive Isometric  - 1 x daily - 7 x weekly - 2 sets - 10 reps  - Standing Shoulder Row Reactive Isometric  - 1 x daily - 7 x weekly - 2 sets - 10 reps  - Sidelying Shoulder External Rotation  - 1 x daily - 7 x weekly - 2 sets - 10 reps  - Shoulder Flexion Wall Slide with Towel  - 1 x daily - 7 x weekly - 2 sets - 10 reps - 3-5 hold  - Scaption Wall Slide with Towel  - 1 x daily - 7 x weekly - 2 sets - 10 reps - 3-5 hold  Access Code: [email or web address]  URL: [email or web address]  Date: 01/08/2024  Prepared by: Arlene Galeano     Exercises  - Seated Upper Trapezius Stretch  - 1 x daily - 7 x weekly - 1 sets - 3 reps - 20-30 hold  - Gentle Levator " Scapulae Stretch  - 1 x daily - 7 x weekly - 1 sets - 3 reps - 20-30 hold  - Standing Isometric Shoulder Internal Rotation at Doorway  - 1 x daily - 7 x weekly - 2 sets - 10 reps - 3-5 hold  - Standing Isometric Shoulder External Rotation with Doorway  - 1 x daily - 7 x weekly - 2 sets - 10 reps - 3-5 hold  - Standing Isometric Shoulder Flexion with Doorway - Arm Bent  - 1 x daily - 7 x weekly - 2 sets - 10 reps - 3-5 hold  - Standing Bicep Curls Supinated with Dumbbells  - 1 x daily - 7 x weekly - 2 sets - 10 reps  - Seated Wrist Flexion with Dumbbell  - 1 x daily - 7 x weekly - 2 sets - 10 reps  - Seated Wrist Extension with Dumbbell  - 1 x daily - 7 x weekly - 2 sets - 10 reps  Not today: 12-18-23:   Access Code: [email or web address]  URL: [email or web address]  Date: 12/13/2023  Prepared by: Abby Infante     Exercises  - Seated Scapular Retraction  - 4 x daily - 7 x weekly - 1 sets - 10 reps  - Seated Backward Shoulder Rolls  - 4 x daily - 7 x weekly - 1 sets - 10 reps  - Supine Elbow Extension Stretch with Weight  - 2 x daily - 7 x weekly - 2 sets - 2 reps - 2-3 min hold  - Forearm Pronation and Supination with Hammer  - 1 x daily - 7 x weekly - 3 sets - 10 reps - 10 hold  - Supine Shoulder Flexion Extension AAROM with Dowel  - 2 x daily - 7 x weekly - 1 sets - 10 reps  - Supine Shoulder Abduction AAROM with Dowel  - 2 x daily - 7 x weekly - 1 sets - 10 reps  - Supine Shoulder Press with Dowel  - 2 x daily - 7 x weekly - 1 sets - 10 reps  - Supine Shoulder External Rotation with Dowel  - 2 x daily - 7 x weekly - 1 sets - 10 reps       Goals:  Active       PT Problem       PT Goal 1       Start:  12/13/23    Expected End:  12/27/23       Patient will have AROM right elbow 0-135 deg without compensation/ease and no pain [id number].  Patient will have WNL forearm pronation and supination without pain/discomfort/with ease         PT Goal 2       Start:  12/13/23    Expected End:  01/10/24        -Patient will have improved AAROM R shoulder to 120 elevation (scaption/flexion) with ease and no discomfort in sitting in order to easily feed self, wash hair, brush teeth  -Patient will have WNL shoulder extension in sitting (AROM) without discomfort/with ease to nav/doff jacket  -Patient will have quick DASH score of 25% or less to improve ADL tolerance  -Patient will be able to sleep in bed with only 1 sleep interruption per night x1 week             PT Goal 3       Start:  12/13/23    Expected End:  02/07/24       -Patient will have AROM R shoulder flexion and scaption to 120 deg without pain/discomfort /with ease to perform overhead activities and ADLS  -Patient will be able to nav/doff upper body garments with ease and without discomfort in normal fashion  -Patient will have quick DASH score of 15% or less to improve ADL tolerance  -patient will have no sleep interruptions in bed x1 week         PT Goal 4       Start:  12/13/23    Expected End:  02/07/24       Patient will be independent with HEP within restrictions of protocol to improve ROM, strength, mobility of RUE for ADLS...ongoing

## 2024-01-31 ENCOUNTER — TREATMENT (OUTPATIENT)
Dept: PHYSICAL THERAPY | Facility: CLINIC | Age: 74
End: 2024-01-31
Payer: MEDICARE

## 2024-01-31 ENCOUNTER — APPOINTMENT (OUTPATIENT)
Dept: PHYSICAL THERAPY | Facility: CLINIC | Age: 74
End: 2024-01-31
Payer: MEDICARE

## 2024-01-31 DIAGNOSIS — M25.611 SHOULDER STIFFNESS, RIGHT: Primary | ICD-10-CM

## 2024-01-31 DIAGNOSIS — Z96.611 S/P REVERSE TOTAL SHOULDER ARTHROPLASTY, RIGHT: ICD-10-CM

## 2024-01-31 DIAGNOSIS — M25.511 ACUTE PAIN OF RIGHT SHOULDER: ICD-10-CM

## 2024-01-31 PROCEDURE — 97110 THERAPEUTIC EXERCISES: CPT | Mod: GP | Performed by: PHYSICAL THERAPIST

## 2024-01-31 PROCEDURE — 97140 MANUAL THERAPY 1/> REGIONS: CPT | Mod: GP | Performed by: PHYSICAL THERAPIST

## 2024-01-31 ASSESSMENT — PAIN - FUNCTIONAL ASSESSMENT: PAIN_FUNCTIONAL_ASSESSMENT: 0-10

## 2024-01-31 ASSESSMENT — PAIN SCALES - GENERAL: PAINLEVEL_OUTOF10: 1

## 2024-01-31 NOTE — PROGRESS NOTES
Physical Therapy    Physical Therapy Treatment    Patient Name: Dionicio Valdez  MRN: [id number]  Today's Date: 1/31/2024  Time Calculation  Start Time: 1322  Stop Time: 1410  Time Calculation (min): 48 min      Assessment:  Patient has attended 10 sessions of outpatient PT following reverse total right shoulder.  He is making good progress with intervention following physician's protocol for therapy.  Following manual therapy today patient able to actively flex right shoulder to 117 deg in standing.  He has abduction to 105 deg, ER to 27deg, IR to 45 deg.  His strength is improving within his limitations.  Plan is to continue with skilled PT intervention an additional 9 sessions twice per week with emphasis on improving ROM and strength of R shoulder to complete ADLS.    PT Assessment  PT Assessment Results: Decreased strength, Decreased range of motion, Decreased endurance, Pain, Orthopedic restrictions  Rehab Prognosis: Good  Plan:  OP PT Plan  Treatment/Interventions: Cryotherapy, Education/ Instruction, Hot pack, Manual therapy, Neuromuscular re-education, Self care/ home management, Therapeutic exercises (may use IASTM at elbow/forearm, outer shoulder, other areas after 6 weeks; scar mobilization; joint mobilization R forearm/elbow)  PT Plan: Skilled PT  PT Frequency: 2 times per week  Duration:  (8 weeks)  Onset Date: 11/16/23  Rehab Potential: Good  Plan of Care Agreement: Patient    Current Problem  1. S/P reverse total shoulder arthroplasty, right  Follow Up In Physical Therapy      2. Acute pain of right shoulder  Follow Up In Physical Therapy      3. Shoulder stiffness, right  Follow Up In Physical Therapy          General  PT  Visit  PT Received On: 01/31/24  General  General Comment: 10/11    Subjective    Precautions  Precautions  Post-Surgical Precautions: Right shoulder precautions (R reverse shoulder arthoscopy 11/16/23; limit forward flexion and elevation in scapular plane in supine to 120 degmay  "begin AAROM at 3 weeks restrict til 6 weeks, IR lmited at 6 weeks may begin IR to 50 deg;  may begin strengthening at 6 weeks)    Pain  Pain Assessment  Pain Assessment: 0-10  Pain Score: 1  Pain Type: Surgical pain  Pain Location: Shoulder  Pain Orientation: Right    Objective   AROM R shoulder BEFORE MANUAl : tflldbq=108ubs, abd=100 deg, qtkijads=067 deg  AROM R shoulder AFTER MANUAL/treatment: Supine AROM: flexion 128=deg  SITTING: mxbqlqh=113 deg   Supine R shoulder  rotation at 30 deg abd:  IR=45 deg, ER=27 deg    Strength R shoulder= 2-/5  Strength of available range R shoulder 3+ to 4-/5    Cognition  Cognition  Overall Cognitive Status: Within Functional Limits  Posture   WFL    Treatments:  Therapeutic Exercise  Therapeutic Exercise Performed: Yes    Therapeutic Exercise  Therapeutic Exercise Performed: Yes  Pulleys forward elevation 3'   UBE 3'fwd 3' bwd   ROM measurements    Not today-emphasis on manual 1/31/24  T-band walk outs   -ER, IR, flex, ext green band 2 x 10 ea   Wall slide flex/scap x10 10\" hold (N)  Elbow flexion 5# 2 x 10   Seated fwd flex 2# 2 x 10   Seated Scaption 1# 2x10 ea   S/L ER 2 x 10 0# (N)     Manual Therapy 27'  Manual Therapy Performed: Yes  IASTM/STM/manual stretching: right pectoralis lateral border, under axilla anteriorly and posteriorly, deltoids, medial scap border (emphasis   Scar mobilization and review  PROM completed.   Scap mobs completed     OP EDUCATION:  Outpatient Education  Individual(s) Educated: Patient, Spouse  Education Provided: Home Exercise Program  Diagnosis and Precautions: R shoulder stiffness, pain, s/p R reverse shoulder 11/16/23, see above prec  Patient Response to Education: Patient/Caregiver Verbalized Understanding of Information, Patient/Caregiver Performed Return Demonstration of Exercises/Activities  Education Comment: add heat under arm/axilla f/b shoulder stretching, add shoulder flexion stretch leaning forward on table    Goals:  Active       " PT Problem       PT Goal 1 (Met)       Start:  12/13/23    Expected End:  12/27/23    Resolved:  01/31/24    Patient will have AROM right elbow 0-135 deg without compensation/ease and no pain [id number].  Patient will have WNL forearm pronation and supination without pain/discomfort/with ease         PT Goal 2 (Progressing)       Start:  12/13/23    Expected End:  02/29/24       -Patient will have improved AAROM R shoulder to 120 elevation (scaption/flexion) with ease and no discomfort in sitting in order to easily feed self, wash hair, brush teeth  PROGRESSING 1/31/24   -Patient will have WNL shoulder extension in sitting (AROM) without discomfort/with ease to nav/doff jacket.. PROGRESSING 1/31/24  -Patient will have quick DASH score of 25% or less to improve ADL tolerance  PROGRESSING, still limited  -Patient will be able to sleep in bed with only 1 sleep interruption per night x1 week  PROGRESSING 1/31/24 patient still having difficulty sleeping due to discomfort of shoulder             PT Goal 3 (Progressing)       Start:  12/13/23    Expected End:  02/29/24       -Patient will have AROM R shoulder flexion and scaption to 120 deg without pain/discomfort /with ease to perform overhead activities and ADLS  PROGRESSING 1/31/24  -Patient will be able to nav/doff upper body garments with ease and without discomfort in normal fashion... PROGRESSING 1/31/24  -Patient will have quick DASH score of 15% or less to improve ADL tolerance  PROGRESSING 1/31/24  -patient will have no sleep interruptions in bed x1 week  PROGRESSING 1/31/24         PT Goal 4 (Progressing)       Start:  12/13/23    Expected End:  02/29/24       Patient will be independent with HEP within restrictions of protocol to improve ROM, strength, mobility of RUE for ADLS...ongoing

## 2024-02-01 PROBLEM — M25.611 SHOULDER STIFFNESS, RIGHT: Status: ACTIVE | Noted: 2024-02-01

## 2024-02-05 ENCOUNTER — TREATMENT (OUTPATIENT)
Dept: PHYSICAL THERAPY | Facility: CLINIC | Age: 74
End: 2024-02-05
Payer: MEDICARE

## 2024-02-05 DIAGNOSIS — M25.611 SHOULDER STIFFNESS, RIGHT: ICD-10-CM

## 2024-02-05 DIAGNOSIS — M25.511 ACUTE PAIN OF RIGHT SHOULDER: ICD-10-CM

## 2024-02-05 DIAGNOSIS — Z96.611 S/P REVERSE TOTAL SHOULDER ARTHROPLASTY, RIGHT: ICD-10-CM

## 2024-02-05 PROCEDURE — 97110 THERAPEUTIC EXERCISES: CPT | Mod: GP,CQ

## 2024-02-05 PROCEDURE — 97140 MANUAL THERAPY 1/> REGIONS: CPT | Mod: GP,CQ

## 2024-02-05 ASSESSMENT — PAIN SCALES - GENERAL: PAINLEVEL_OUTOF10: 2

## 2024-02-05 ASSESSMENT — PAIN - FUNCTIONAL ASSESSMENT: PAIN_FUNCTIONAL_ASSESSMENT: 0-10

## 2024-02-05 NOTE — PROGRESS NOTES
Physical Therapy Treatment    Patient Name: Dionicio Valdez  MRN: [id number]  Today's Date: 2/5/2024  Time Calculation  Start Time: 1530  Stop Time: 1614  Time Calculation (min): 44 min      Assessment:   Patient identified by name and date of birth. Patient presented with palpable adhesions muscle restrictions with IASTM/STW. He presented with hard end feel with PROM and required cues to relax. He was able to progress with strengthening with mild sourness reported after.     Plan:  OP PT Plan  Treatment/Interventions: Cryotherapy, Education/ Instruction, Hot pack, Manual therapy, Neuromuscular re-education, Self care/ home management, Therapeutic exercises (may use IASTM at elbow/forearm, outer shoulder, other areas after 6 weeks; scar mobilization; joint mobilization R forearm/elbow)  PT Plan: Skilled PT  PT Frequency: 2 times per week  Duration:  (8 weeks)  Onset Date: 11/16/23  Rehab Potential: Good  Plan of Care Agreement: Patient  Continue with manual as needed for increased ROM along with strengthening per protocol for increased ease with ADL's. AW  Current Problem  Problem List Items Addressed This Visit             ICD-10-CM    Acute pain of right shoulder M25.511    S/P reverse total shoulder arthroplasty, 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    Shoulder stiffness, right M25.611       Subjective Patient reported compliance with % of the time and verbalized understanding.  Patient reported increased soreness after treatment.   General  General Comment: 1/4 (02/02/24-3/2/24)  Precautions  Precautions  Post-Surgical Precautions: Right shoulder precautions (R reverse shoulder arthoscopy 11/16/23; limit forward flexion and elevation in scapular plane in supine to 120 degmay begin AAROM at 3 weeks restrict til 6 weeks, IR lmited at 6 weeks may begin IR to 50 deg;  may begin strengthening at 6 weeks)    Pain  Pain Assessment: 0-10  Pain Score: 2  Pain Type: Surgical pain  Pain Location: Shoulder  Pain Orientation: Right      "Treatments:  Therapeutic Exercise  Therapeutic Exercise Performed: Yes  Pulleys forward elevation 3'   UBE 3'fwd 3' bwd   T-band active (progress from walk outs)  -ER, IR, flex, ext green band 2 x 10 ea   Wall slide flex/scap x10 10\" hold   Ball on wall play ball x10   -flex/ext,lat,CW,CCW  Elbow flexion 5# 2 x 10   Seated fwd flex 2# 2 x 10   Seated Scaption 1# 2x10 ea (X)  ABC trace x1 cycle   S/L ER 2 x 10 0#     Manual Therapy  Manual Therapy Performed: Yes   IASTM/STM/manual stretching: right pectoralis lateral border, under axilla anteriorly and posteriorly, deltoids, medial scap border (emphasis   Scar mobilization and review  PROM completed.   Scap mobs completed      OP EDUCATION:   Outpatient Education  Individual(s) Educated: Patient, Spouse  Education Provided: Home Exercise Program  Diagnosis and Precautions: R shoulder stiffness, pain, s/p R reverse shoulder 11/16/23, see above prec  Patient Response to Education: Patient/Caregiver Verbalized Understanding of Information, Patient/Caregiver Performed Return Demonstration of Exercises/Activities  Education Comment: add heat under arm/axilla f/b shoulder stretching, add shoulder flexion stretch leaning forward on table       Goals:  Active       PT Problem       PT Goal 1 (Met)       Start:  12/13/23    Expected End:  12/27/23    Resolved:  01/31/24    Patient will have AROM right elbow 0-135 deg without compensation/ease and no pain [id number].  Patient will have WNL forearm pronation and supination without pain/discomfort/with ease         PT Goal 2 (Progressing)       Start:  12/13/23    Expected End:  02/29/24       -Patient will have improved AAROM R shoulder to 120 elevation (scaption/flexion) with ease and no discomfort in sitting in order to easily feed self, wash hair, brush teeth  PROGRESSING 1/31/24   -Patient will have WNL shoulder extension in sitting (AROM) without discomfort/with ease to nav/doff jacket.. PROGRESSING " 1/31/24  -Patient will have quick DASH score of 25% or less to improve ADL tolerance  PROGRESSING, still limited  -Patient will be able to sleep in bed with only 1 sleep interruption per night x1 week  PROGRESSING 1/31/24 patient still having difficulty sleeping due to discomfort of shoulder             PT Goal 3 (Progressing)       Start:  12/13/23    Expected End:  02/29/24       -Patient will have AROM R shoulder flexion and scaption to 120 deg without pain/discomfort /with ease to perform overhead activities and ADLS  PROGRESSING 1/31/24  -Patient will be able to nav/doff upper body garments with ease and without discomfort in normal fashion... PROGRESSING 1/31/24  -Patient will have quick DASH score of 15% or less to improve ADL tolerance  PROGRESSING 1/31/24  -patient will have no sleep interruptions in bed x1 week  PROGRESSING 1/31/24         PT Goal 4 (Progressing)       Start:  12/13/23    Expected End:  02/29/24       Patient will be independent with HEP within restrictions of protocol to improve ROM, strength, mobility of RUE for ADLS...ongoing

## 2024-02-14 ENCOUNTER — TREATMENT (OUTPATIENT)
Dept: PHYSICAL THERAPY | Facility: CLINIC | Age: 74
End: 2024-02-14
Payer: MEDICARE

## 2024-02-14 DIAGNOSIS — Z96.611 S/P REVERSE TOTAL SHOULDER ARTHROPLASTY, RIGHT: ICD-10-CM

## 2024-02-14 DIAGNOSIS — M25.611 SHOULDER STIFFNESS, RIGHT: ICD-10-CM

## 2024-02-14 DIAGNOSIS — M25.511 ACUTE PAIN OF RIGHT SHOULDER: ICD-10-CM

## 2024-02-14 PROCEDURE — 97140 MANUAL THERAPY 1/> REGIONS: CPT | Mod: GP,CQ

## 2024-02-14 PROCEDURE — 97110 THERAPEUTIC EXERCISES: CPT | Mod: GP,CQ

## 2024-02-14 ASSESSMENT — PAIN SCALES - GENERAL: PAINLEVEL_OUTOF10: 0 - NO PAIN

## 2024-02-14 ASSESSMENT — PAIN - FUNCTIONAL ASSESSMENT: PAIN_FUNCTIONAL_ASSESSMENT: 0-10

## 2024-02-14 NOTE — PROGRESS NOTES
Physical Therapy Treatment    Patient Name: Dionicio Valdez  MRN: [id number]  Today's Date: 2/14/2024  Time Calculation  Start Time: 1527  Stop Time: 1614  Time Calculation (min): 47 min      Assessment:   Patient identified by name and date of birth. Patient presented with weakness in scapula with popping around medial boarder. Progressed with strengthening with addition of prone exercises he presented with fatigue without pain with exercises.  He demonstrated improved ROM with PROM this date.     Plan:  OP PT Plan  Treatment/Interventions: Cryotherapy, Education/ Instruction, Hot pack, Manual therapy, Neuromuscular re-education, Self care/ home management, Therapeutic exercises (may use IASTM at elbow/forearm, outer shoulder, other areas after 6 weeks; scar mobilization; joint mobilization R forearm/elbow)  PT Plan: Skilled PT  PT Frequency: 2 times per week  Duration:  (8 weeks)  Onset Date: 11/16/23  Rehab Potential: Good  Plan of Care Agreement: Patient  Continue with progression of ROM and strengthening per protocol with STW as needed for increased ease with ADL's.   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  He reported 1/10 pain   General  General Comment: 2/4 (02/02/24-3/2/24)  Precautions  Precautions  Post-Surgical Precautions: Right shoulder precautions (R reverse shoulder arthoscopy 11/16/23; limit forward flexion and elevation in scapular plane in supine to 120 degmay begin AAROM at 3 weeks restrict til 6 weeks, IR lmited at 6 weeks may begin IR to 50 deg;  may begin strengthening at 6 weeks)    Pain  Pain Assessment: 0-10  Pain Score: 0 - No pain     Treatments:  Therapeutic Exercise  Therapeutic Exercise Performed: Yes  Steff  "forward elevation 3'   UBE 3'fwd 3' bwd   T-band active (progress from walk outs)  -ER, IR, flex, ext green band 2 x 10 ea   Wall slide flex/scap x10 10\" hold   Ball on wall play ball x20 2#   -flex/ext,lat,CW,CCW  Prone (N)  -Rows x10 2#  -abd x10 2#   -scap x10 2#  -ext x10 2#  S/L scap retraction with abd x10   S/L scap retraction with flex x10   Seated B ER no resistance x10 2\" hold   Door ER stretch mild tension 3 x 10\"    Not this date   Elbow flexion 5# 2 x 10   Seated fwd flex 2# 2 x 10   Seated Scaption 1# 2x10 ea (X)  ABC trace x1 cycle   S/L ER 2 x 10 0#      Manual Therapy  Manual Therapy Performed: Yes    IASTM/STM/manual stretching: right pectoralis lateral border, under axilla anteriorly and posteriorly, deltoids, medial scap border (emphasis   Scar mobilization and review  PROM completed.   Scap mobs completed     OP EDUCATION:   Access Code: [email or web address]  URL: [email or web address]  Date: 02/14/2024  Prepared by: Arlene Galeano    Exercises  - Prone Single Arm Shoulder Y  - 1 x daily - 7 x weekly - 2 sets - 10 reps  - Prone Shoulder Horizontal Abduction  - 1 x daily - 7 x weekly - 2 sets - 10 reps  - Prone Shoulder Extension - Single Arm  - 1 x daily - 7 x weekly - 2 sets - 10 reps  - Prone Shoulder Row  - 1 x daily - 7 x weekly - 2 sets - 10 reps  - Standing Bilateral Low Shoulder Row with Anchored Resistance  - 1 x daily - 7 x weekly - 2 sets - 10 reps  - Shoulder External Rotation and Scapular Retraction  - 1 x daily - 7 x weekly - 2 sets - 10 reps  - Standing Shoulder External Rotation Stretch in Doorway  - 1 x daily - 7 x weekly - 1 sets - 3 reps - 10 hold   Outpatient Education  Individual(s) Educated: Patient, Spouse  Education Provided: Home Exercise Program  Diagnosis and Precautions: R shoulder stiffness, pain, s/p R reverse shoulder 11/16/23, see above prec  Patient Response to Education: Patient/Caregiver Verbalized Understanding of Information, Patient/Caregiver " Performed Return Demonstration of Exercises/Activities  Education Comment: add heat under arm/axilla f/b shoulder stretching, add shoulder flexion stretch leaning forward on table   Access Code: [email or web address]  URL: [email or web address]  Date: 01/26/2024  Prepared by: Arlene Galeano     Exercises  - Shoulder External Rotation Reactive Isometrics  - 1 x daily - 7 x weekly - 2 sets - 10 reps  - Shoulder Internal Rotation Reactive Isometrics  - 1 x daily - 7 x weekly - 2 sets - 10 reps  - Standing Shoulder Flexion Reactive Isometric  - 1 x daily - 7 x weekly - 2 sets - 10 reps  - Standing Shoulder Row Reactive Isometric  - 1 x daily - 7 x weekly - 2 sets - 10 reps  - Sidelying Shoulder External Rotation  - 1 x daily - 7 x weekly - 2 sets - 10 reps  - Shoulder Flexion Wall Slide with Towel  - 1 x daily - 7 x weekly - 2 sets - 10 reps - 3-5 hold  - Scaption Wall Slide with Towel  - 1 x daily - 7 x weekly - 2 sets - 10 reps - 3-5 hold  Access Code: [email or web address]  URL: [email or web address]  Date: 01/08/2024  Prepared by: Arlene Galeano     Exercises  - Seated Upper Trapezius Stretch  - 1 x daily - 7 x weekly - 1 sets - 3 reps - 20-30 hold  - Gentle Levator Scapulae Stretch  - 1 x daily - 7 x weekly - 1 sets - 3 reps - 20-30 hold  - Standing Isometric Shoulder Internal Rotation at Doorway  - 1 x daily - 7 x weekly - 2 sets - 10 reps - 3-5 hold  - Standing Isometric Shoulder External Rotation with Doorway  - 1 x daily - 7 x weekly - 2 sets - 10 reps - 3-5 hold  - Standing Isometric Shoulder Flexion with Doorway - Arm Bent  - 1 x daily - 7 x weekly - 2 sets - 10 reps - 3-5 hold  - Standing Bicep Curls Supinated with Dumbbells  - 1 x daily - 7 x weekly - 2 sets - 10 reps  - Seated Wrist Flexion with Dumbbell  - 1 x daily - 7 x weekly - 2 sets - 10 reps  - Seated Wrist Extension with Dumbbell  - 1 x daily - 7 x weekly - 2 sets - 10 reps  Not today: 12-18-23:   Access Code: [email or web address]  URL:  [email or web address]  Date: 12/13/2023  Prepared by: Abby Infante     Exercises  - Seated Scapular Retraction  - 4 x daily - 7 x weekly - 1 sets - 10 reps  - Seated Backward Shoulder Rolls  - 4 x daily - 7 x weekly - 1 sets - 10 reps  - Supine Elbow Extension Stretch with Weight  - 2 x daily - 7 x weekly - 2 sets - 2 reps - 2-3 min hold  - Forearm Pronation and Supination with Hammer  - 1 x daily - 7 x weekly - 3 sets - 10 reps - 10 hold  - Supine Shoulder Flexion Extension AAROM with Dowel  - 2 x daily - 7 x weekly - 1 sets - 10 reps  - Supine Shoulder Abduction AAROM with Dowel  - 2 x daily - 7 x weekly - 1 sets - 10 reps  - Supine Shoulder Press with Dowel  - 2 x daily - 7 x weekly - 1 sets - 10 reps  - Supine Shoulder External Rotation with Dowel  - 2 x daily - 7 x weekly - 1 sets - 10 reps     Goals:  Active       PT Problem       PT Goal 1 (Met)       Start:  12/13/23    Expected End:  12/27/23    Resolved:  01/31/24    Patient will have AROM right elbow 0-135 deg without compensation/ease and no pain [id number].  Patient will have WNL forearm pronation and supination without pain/discomfort/with ease         PT Goal 2 (Progressing)       Start:  12/13/23    Expected End:  02/29/24       -Patient will have improved AAROM R shoulder to 120 elevation (scaption/flexion) with ease and no discomfort in sitting in order to easily feed self, wash hair, brush teeth  PROGRESSING 1/31/24   -Patient will have WNL shoulder extension in sitting (AROM) without discomfort/with ease to nav/doff jacket.. PROGRESSING 1/31/24  -Patient will have quick DASH score of 25% or less to improve ADL tolerance  PROGRESSING, still limited  -Patient will be able to sleep in bed with only 1 sleep interruption per night x1 week  PROGRESSING 1/31/24 patient still having difficulty sleeping due to discomfort of shoulder             PT Goal 3 (Progressing)       Start:  12/13/23    Expected End:  02/29/24        -Patient will have AROM R shoulder flexion and scaption to 120 deg without pain/discomfort /with ease to perform overhead activities and ADLS  PROGRESSING 1/31/24  -Patient will be able to nav/doff upper body garments with ease and without discomfort in normal fashion... PROGRESSING 1/31/24  -Patient will have quick DASH score of 15% or less to improve ADL tolerance  PROGRESSING 1/31/24  -patient will have no sleep interruptions in bed x1 week  PROGRESSING 1/31/24         PT Goal 4 (Progressing)       Start:  12/13/23    Expected End:  02/29/24       Patient will be independent with HEP within restrictions of protocol to improve ROM, strength, mobility of RUE for ADLS...ongoing

## 2024-02-19 ENCOUNTER — TREATMENT (OUTPATIENT)
Dept: PHYSICAL THERAPY | Facility: CLINIC | Age: 74
End: 2024-02-19
Payer: MEDICARE

## 2024-02-19 DIAGNOSIS — M25.511 ACUTE PAIN OF RIGHT SHOULDER: ICD-10-CM

## 2024-02-19 DIAGNOSIS — M25.611 SHOULDER STIFFNESS, RIGHT: ICD-10-CM

## 2024-02-19 DIAGNOSIS — Z96.611 S/P REVERSE TOTAL SHOULDER ARTHROPLASTY, RIGHT: ICD-10-CM

## 2024-02-19 PROCEDURE — 97140 MANUAL THERAPY 1/> REGIONS: CPT | Mod: GP,CQ

## 2024-02-19 PROCEDURE — 97110 THERAPEUTIC EXERCISES: CPT | Mod: GP,CQ

## 2024-02-19 ASSESSMENT — PAIN - FUNCTIONAL ASSESSMENT: PAIN_FUNCTIONAL_ASSESSMENT: 0-10

## 2024-02-19 ASSESSMENT — PAIN SCALES - GENERAL: PAINLEVEL_OUTOF10: 0 - NO PAIN

## 2024-02-19 NOTE — PROGRESS NOTES
"Physical Therapy Treatment    Patient Name: Dionicio Valdez  MRN: [id number]  Today's Date: 2/19/2024  Time Calculation  Start Time: 1045  Stop Time: 1130  Time Calculation (min): 45 min  PT Therapeutic Procedures Time Entry  Manual Therapy Time Entry: 10  Therapeutic Exercise Time Entry: 33       Assessment:   Patient identified by name and date of birth. Reviewed HEP, patient demonstrated good understanding. He demonstrated improved ease with exercises with increased ROM completed. He presented with firm end feel with PROM with muscular restrictions noted.     Plan:  OP PT Plan  Treatment/Interventions: Cryotherapy, Education/ Instruction, Hot pack, Manual therapy, Neuromuscular re-education, Self care/ home management, Therapeutic exercises (may use IASTM at elbow/forearm, outer shoulder, other areas after 6 weeks; scar mobilization; joint mobilization R forearm/elbow)  PT Plan: Skilled PT  PT Frequency: 2 times per week  Duration:  (8 weeks)  Onset Date: 11/16/23  Rehab Potential: Good  Plan of Care Agreement: Patient  Continue with progression of ROM and strengthening for increased ease with ADL's.AW  Current Problem  Problem List Items Addressed This Visit             ICD-10-CM    Acute pain of right shoulder M25.511    S/P reverse total shoulder arthroplasty, 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  He reported he continues to experience \"popping\" in his shoulder blade with some movements.   General  General Comment: 2/4 (02/02/24-3/2/24)  Precautions  Precautions  Post-Surgical Precautions: Right shoulder precautions (R reverse shoulder arthoscopy 11/16/23; limit forward flexion and elevation in scapular plane in supine to 120 degmay begin AAROM at 3 weeks restrict til 6 weeks, IR lmited at 6 weeks may begin IR to 50 deg;  may begin strengthening at 6 weeks)    Pain  Pain Assessment: 0-10  Pain " "Score: 0 - No pain     Treatments:  Therapeutic Exercise  Therapeutic Exercise Performed: Yes  Pulleys forward elevation 3'   UBE 3' fwd 3' bwd Level 3   T-band active (progress from walk outs)  -ER, IR, flex, ext green band 3 x 10 ea   Wall slide flex/scap x10 10\" hold (X)  Ball on wall play ball x20 4# (P)  -flex/ext,lat,CW,CCW  Door ER stretch mild tension 3 x 10\"  Prone   -Rows x10 2#  -abd x10 2#   -scap x10 2#  -ext x10 2#  S/L ER 2 x 10 2#   S/L scap retraction with abd x10   S/L scap retraction with flex x10   Seated B ER no resistance x10 2\" hold      Not this date   Elbow flexion 5# 2 x 10   Seated fwd flex 2# 2 x 10   Seated Scaption 1# 2x10 ea (X)  ABC trace x1 cycle       Manual Therapy  Manual Therapy Performed: Yes  IASTM/STM/manual stretching: right pectoralis lateral border, under axilla anteriorly and posteriorly, deltoids, medial scap border (emphasis   Scar mobilization and review  PROM completed.   Scap mobs completed      OP EDUCATION:   Access Code: [email or web address]  URL: [email or web address]  Date: 02/14/2024  Prepared by: Arlene Galeano     Exercises  - Prone Single Arm Shoulder Y  - 1 x daily - 7 x weekly - 2 sets - 10 reps  - Prone Shoulder Horizontal Abduction  - 1 x daily - 7 x weekly - 2 sets - 10 reps  - Prone Shoulder Extension - Single Arm  - 1 x daily - 7 x weekly - 2 sets - 10 reps  - Prone Shoulder Row  - 1 x daily - 7 x weekly - 2 sets - 10 reps  - Standing Bilateral Low Shoulder Row with Anchored Resistance  - 1 x daily - 7 x weekly - 2 sets - 10 reps  - Shoulder External Rotation and Scapular Retraction  - 1 x daily - 7 x weekly - 2 sets - 10 reps  - Standing Shoulder External Rotation Stretch in Doorway  - 1 x daily - 7 x weekly - 1 sets - 3 reps - 10 hold   Outpatient Education  Individual(s) Educated: Patient, Spouse  Education Provided: Home Exercise Program  Diagnosis and Precautions: R shoulder stiffness, pain, s/p R reverse shoulder 11/16/23, see above " prec  Patient Response to Education: Patient/Caregiver Verbalized Understanding of Information, Patient/Caregiver Performed Return Demonstration of Exercises/Activities  Education Comment: add heat under arm/axilla f/b shoulder stretching, add shoulder flexion stretch leaning forward on table   Access Code: [email or web address]  URL: [email or web address]  Date: 01/26/2024  Prepared by: Arlene Galeano     Exercises  - Shoulder External Rotation Reactive Isometrics  - 1 x daily - 7 x weekly - 2 sets - 10 reps  - Shoulder Internal Rotation Reactive Isometrics  - 1 x daily - 7 x weekly - 2 sets - 10 reps  - Standing Shoulder Flexion Reactive Isometric  - 1 x daily - 7 x weekly - 2 sets - 10 reps  - Standing Shoulder Row Reactive Isometric  - 1 x daily - 7 x weekly - 2 sets - 10 reps  - Sidelying Shoulder External Rotation  - 1 x daily - 7 x weekly - 2 sets - 10 reps  - Shoulder Flexion Wall Slide with Towel  - 1 x daily - 7 x weekly - 2 sets - 10 reps - 3-5 hold  - Scaption Wall Slide with Towel  - 1 x daily - 7 x weekly - 2 sets - 10 reps - 3-5 hold  Access Code: [email or web address]  URL: [email or web address]  Date: 01/08/2024  Prepared by: Arlene Galeano     Exercises  - Seated Upper Trapezius Stretch  - 1 x daily - 7 x weekly - 1 sets - 3 reps - 20-30 hold  - Gentle Levator Scapulae Stretch  - 1 x daily - 7 x weekly - 1 sets - 3 reps - 20-30 hold  - Standing Isometric Shoulder Internal Rotation at Doorway  - 1 x daily - 7 x weekly - 2 sets - 10 reps - 3-5 hold  - Standing Isometric Shoulder External Rotation with Doorway  - 1 x daily - 7 x weekly - 2 sets - 10 reps - 3-5 hold  - Standing Isometric Shoulder Flexion with Doorway - Arm Bent  - 1 x daily - 7 x weekly - 2 sets - 10 reps - 3-5 hold  - Standing Bicep Curls Supinated with Dumbbells  - 1 x daily - 7 x weekly - 2 sets - 10 reps  - Seated Wrist Flexion with Dumbbell  - 1 x daily - 7 x weekly - 2 sets - 10 reps  - Seated Wrist Extension  with Dumbbell  - 1 x daily - 7 x weekly - 2 sets - 10 reps  Not today: 12-18-23:   Access Code: [email or web address]  URL: [email or web address]  Date: 12/13/2023  Prepared by: Abby Infante     Exercises  - Seated Scapular Retraction  - 4 x daily - 7 x weekly - 1 sets - 10 reps  - Seated Backward Shoulder Rolls  - 4 x daily - 7 x weekly - 1 sets - 10 reps  - Supine Elbow Extension Stretch with Weight  - 2 x daily - 7 x weekly - 2 sets - 2 reps - 2-3 min hold  - Forearm Pronation and Supination with Hammer  - 1 x daily - 7 x weekly - 3 sets - 10 reps - 10 hold  - Supine Shoulder Flexion Extension AAROM with Dowel  - 2 x daily - 7 x weekly - 1 sets - 10 reps  - Supine Shoulder Abduction AAROM with Dowel  - 2 x daily - 7 x weekly - 1 sets - 10 reps  - Supine Shoulder Press with Dowel  - 2 x daily - 7 x weekly - 1 sets - 10 reps  - Supine Shoulder External Rotation with Dowel  - 2 x daily - 7 x weekly - 1 sets - 10 reps       Goals:  Active       PT Problem       PT Goal 1 (Met)       Start:  12/13/23    Expected End:  12/27/23    Resolved:  01/31/24    Patient will have AROM right elbow 0-135 deg without compensation/ease and no pain [id number].  Patient will have WNL forearm pronation and supination without pain/discomfort/with ease         PT Goal 2 (Progressing)       Start:  12/13/23    Expected End:  02/29/24       -Patient will have improved AAROM R shoulder to 120 elevation (scaption/flexion) with ease and no discomfort in sitting in order to easily feed self, wash hair, brush teeth  PROGRESSING 1/31/24   -Patient will have WNL shoulder extension in sitting (AROM) without discomfort/with ease to nav/doff jacket.. PROGRESSING 1/31/24  -Patient will have quick DASH score of 25% or less to improve ADL tolerance  PROGRESSING, still limited  -Patient will be able to sleep in bed with only 1 sleep interruption per night x1 week  PROGRESSING 1/31/24 patient still having difficulty sleeping due to  discomfort of shoulder             PT Goal 3 (Progressing)       Start:  12/13/23    Expected End:  02/29/24       -Patient will have AROM R shoulder flexion and scaption to 120 deg without pain/discomfort /with ease to perform overhead activities and ADLS  PROGRESSING 1/31/24  -Patient will be able to nav/doff upper body garments with ease and without discomfort in normal fashion... PROGRESSING 1/31/24  -Patient will have quick DASH score of 15% or less to improve ADL tolerance  PROGRESSING 1/31/24  -patient will have no sleep interruptions in bed x1 week  PROGRESSING 1/31/24         PT Goal 4 (Progressing)       Start:  12/13/23    Expected End:  02/29/24       Patient will be independent with HEP within restrictions of protocol to improve ROM, strength, mobility of RUE for ADLS...ongoing

## 2024-02-23 ENCOUNTER — TREATMENT (OUTPATIENT)
Dept: PHYSICAL THERAPY | Facility: CLINIC | Age: 74
End: 2024-02-23
Payer: MEDICARE

## 2024-02-23 DIAGNOSIS — Z96.611 S/P REVERSE TOTAL SHOULDER ARTHROPLASTY, RIGHT: ICD-10-CM

## 2024-02-23 DIAGNOSIS — M25.511 ACUTE PAIN OF RIGHT SHOULDER: ICD-10-CM

## 2024-02-23 DIAGNOSIS — M25.611 SHOULDER STIFFNESS, RIGHT: ICD-10-CM

## 2024-02-23 PROCEDURE — 97140 MANUAL THERAPY 1/> REGIONS: CPT | Mod: GP,CQ

## 2024-02-23 PROCEDURE — 97110 THERAPEUTIC EXERCISES: CPT | Mod: GP,CQ

## 2024-02-23 ASSESSMENT — PAIN SCALES - GENERAL: PAINLEVEL_OUTOF10: 0 - NO PAIN

## 2024-02-23 ASSESSMENT — PAIN - FUNCTIONAL ASSESSMENT: PAIN_FUNCTIONAL_ASSESSMENT: 0-10

## 2024-02-23 NOTE — PROGRESS NOTES
Physical Therapy Treatment    Patient Name: Dionicio Valdez  MRN: [id number]  Today's Date: 2/23/2024  Time Calculation  Start Time: 1400  Stop Time: 1448  Time Calculation (min): 48 min  PT Therapeutic Procedures Time Entry  Manual Therapy Time Entry: 14  Therapeutic Exercise Time Entry: 32       Assessment:   Patient identified by name and date of birth. Patient demonstrated good understanding of HEP with review. He demonstrated good form with minimal to no cues for correction. He is limited with ROM with firm end feel. Added and reviewed for HEP additional stretches he demonstrated good understanding.     Plan:  OP PT Plan  Treatment/Interventions: Cryotherapy, Education/ Instruction, Hot pack, Manual therapy, Neuromuscular re-education, Self care/ home management, Therapeutic exercises (may use IASTM at elbow/forearm, outer shoulder, other areas after 6 weeks; scar mobilization; joint mobilization R forearm/elbow)  PT Plan: Skilled PT  PT Frequency: 2 times per week  Duration:  (8 weeks)  Onset Date: 11/16/23  Rehab Potential: Good  Plan of Care Agreement: Patient  Patient has re-check with PT next visit.   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  He reported no increased Sx after treatment.   General  General Comment: 2/4 (02/02/24-3/2/24)  Precautions  Precautions  Post-Surgical Precautions: Right shoulder precautions (R reverse shoulder arthoscopy 11/16/23; limit forward flexion and elevation in scapular plane in supine to 120 degmay begin AAROM at 3 weeks restrict til 6 weeks, IR lmited at 6 weeks may begin IR to 50 deg;  may begin strengthening at 6 weeks)    Pain  Pain Assessment: 0-10  Pain Score: 0 - No pain     Treatments:  Therapeutic  "Exercise  Therapeutic Exercise Performed: Yes  Pulleys forward elevation 3'   UBE 3' fwd 3' bwd Level 3   T-band active (progress from walk outs)  -ER, IR, flex, ext green band 3 x 10 ea   Wall slide flex/scap x10 10\" hold (X)  Ball on wall play ball x20 4# (P)  -flex/ext,lat,CW,CCW  Stabilize and reach   Rows 2 x 10 (X) reviewed for HEP   ER stretch mild tension 3 x 10\"  Door stretch 3 ways 3 x 10\" cues to perform writhen tolerance   Prone   -Rows 2x10 2#  -abd 2x10 2#   -scap 2x10 2#  -ext 2x10 2#  S/L ER 2 x 10 2#   S/L scap retraction with abd x10   S/L scap retraction with flex x10   Seated B ER no resistance x10 2\" hold      Not this date   Elbow flexion 5# 2 x 10   Seated fwd flex 2# 2 x 10   Seated Scaption 1# 2x10 ea (X)  ABC trace x1 cycle     Manual Therapy  Manual Therapy Performed: Yes  IASTM/STM/manual stretching: right pectoralis lateral border, under axilla anteriorly and posteriorly, deltoids, medial scap border (emphasis   Scar mobilization and review  PROM completed.   Scap mobs completed     OP EDUCATION:   Access Code: [email or web address]  URL: [email or web address]  Date: 02/23/2024  Prepared by: Arlene Galeano    Exercises  - Doorway Pec Stretch at 60 Elevation  - 1 x daily - 7 x weekly - 3 sets - 10 reps  - Doorway Pec Stretch at 90 Degrees Abduction  - 1 x daily - 7 x weekly - 3 sets - 10 reps  Access Code: [email or web address]  URL: [email or web address]  Date: 02/14/2024  Prepared by: Arlene Galeano     Exercises  - Prone Single Arm Shoulder Y  - 1 x daily - 7 x weekly - 2 sets - 10 reps  - Prone Shoulder Horizontal Abduction  - 1 x daily - 7 x weekly - 2 sets - 10 reps  - Prone Shoulder Extension - Single Arm  - 1 x daily - 7 x weekly - 2 sets - 10 reps  - Prone Shoulder Row  - 1 x daily - 7 x weekly - 2 sets - 10 reps  - Standing Bilateral Low Shoulder Row with Anchored Resistance  - 1 x daily - 7 x weekly - 2 sets - 10 reps  - Shoulder External Rotation and " Scapular Retraction  - 1 x daily - 7 x weekly - 2 sets - 10 reps  - Standing Shoulder External Rotation Stretch in Doorway  - 1 x daily - 7 x weekly - 1 sets - 3 reps - 10 hold   Outpatient Education  Individual(s) Educated: Patient, Spouse  Education Provided: Home Exercise Program  Diagnosis and Precautions: R shoulder stiffness, pain, s/p R reverse shoulder 11/16/23, see above prec  Patient Response to Education: Patient/Caregiver Verbalized Understanding of Information, Patient/Caregiver Performed Return Demonstration of Exercises/Activities  Education Comment: add heat under arm/axilla f/b shoulder stretching, add shoulder flexion stretch leaning forward on table   Access Code: [email or web address]  URL: [email or web address]  Date: 01/26/2024  Prepared by: Arlene Galeano     Exercises  - Shoulder External Rotation Reactive Isometrics  - 1 x daily - 7 x weekly - 2 sets - 10 reps  - Shoulder Internal Rotation Reactive Isometrics  - 1 x daily - 7 x weekly - 2 sets - 10 reps  - Standing Shoulder Flexion Reactive Isometric  - 1 x daily - 7 x weekly - 2 sets - 10 reps  - Standing Shoulder Row Reactive Isometric  - 1 x daily - 7 x weekly - 2 sets - 10 reps  - Sidelying Shoulder External Rotation  - 1 x daily - 7 x weekly - 2 sets - 10 reps  - Shoulder Flexion Wall Slide with Towel  - 1 x daily - 7 x weekly - 2 sets - 10 reps - 3-5 hold  - Scaption Wall Slide with Towel  - 1 x daily - 7 x weekly - 2 sets - 10 reps - 3-5 hold  Access Code: [email or web address]  URL: [email or web address]  Date: 01/08/2024  Prepared by: Arlene Galeano     Exercises  - Seated Upper Trapezius Stretch  - 1 x daily - 7 x weekly - 1 sets - 3 reps - 20-30 hold  - Gentle Levator Scapulae Stretch  - 1 x daily - 7 x weekly - 1 sets - 3 reps - 20-30 hold  - Standing Isometric Shoulder Internal Rotation at Doorway  - 1 x daily - 7 x weekly - 2 sets - 10 reps - 3-5 hold  - Standing Isometric Shoulder External Rotation with  Doorway  - 1 x daily - 7 x weekly - 2 sets - 10 reps - 3-5 hold  - Standing Isometric Shoulder Flexion with Doorway - Arm Bent  - 1 x daily - 7 x weekly - 2 sets - 10 reps - 3-5 hold  - Standing Bicep Curls Supinated with Dumbbells  - 1 x daily - 7 x weekly - 2 sets - 10 reps  - Seated Wrist Flexion with Dumbbell  - 1 x daily - 7 x weekly - 2 sets - 10 reps  - Seated Wrist Extension with Dumbbell  - 1 x daily - 7 x weekly - 2 sets - 10 reps  Not today: 12-18-23:   Access Code: [email or web address]  URL: [email or web address]  Date: 12/13/2023  Prepared by: Abby Infante     Exercises  - Seated Scapular Retraction  - 4 x daily - 7 x weekly - 1 sets - 10 reps  - Seated Backward Shoulder Rolls  - 4 x daily - 7 x weekly - 1 sets - 10 reps  - Supine Elbow Extension Stretch with Weight  - 2 x daily - 7 x weekly - 2 sets - 2 reps - 2-3 min hold  - Forearm Pronation and Supination with Hammer  - 1 x daily - 7 x weekly - 3 sets - 10 reps - 10 hold  - Supine Shoulder Flexion Extension AAROM with Dowel  - 2 x daily - 7 x weekly - 1 sets - 10 reps  - Supine Shoulder Abduction AAROM with Dowel  - 2 x daily - 7 x weekly - 1 sets - 10 reps  - Supine Shoulder Press with Dowel  - 2 x daily - 7 x weekly - 1 sets - 10 reps  - Supine Shoulder External Rotation with Dowel  - 2 x daily - 7 x weekly - 1 sets - 10 reps       Goals:  Active       PT Problem       PT Goal 1 (Met)       Start:  12/13/23    Expected End:  12/27/23    Resolved:  01/31/24    Patient will have AROM right elbow 0-135 deg without compensation/ease and no pain [id number].  Patient will have WNL forearm pronation and supination without pain/discomfort/with ease         PT Goal 2 (Progressing)       Start:  12/13/23    Expected End:  02/29/24       -Patient will have improved AAROM R shoulder to 120 elevation (scaption/flexion) with ease and no discomfort in sitting in order to easily feed self, wash hair, brush teeth  PROGRESSING 1/31/24    -Patient will have WNL shoulder extension in sitting (AROM) without discomfort/with ease to nav/doff jacket.. PROGRESSING 1/31/24  -Patient will have quick DASH score of 25% or less to improve ADL tolerance  PROGRESSING, still limited  -Patient will be able to sleep in bed with only 1 sleep interruption per night x1 week  PROGRESSING 1/31/24 patient still having difficulty sleeping due to discomfort of shoulder             PT Goal 3 (Progressing)       Start:  12/13/23    Expected End:  02/29/24       -Patient will have AROM R shoulder flexion and scaption to 120 deg without pain/discomfort /with ease to perform overhead activities and ADLS  PROGRESSING 1/31/24  -Patient will be able to nav/doff upper body garments with ease and without discomfort in normal fashion... PROGRESSING 1/31/24  -Patient will have quick DASH score of 15% or less to improve ADL tolerance  PROGRESSING 1/31/24  -patient will have no sleep interruptions in bed x1 week  PROGRESSING 1/31/24         PT Goal 4 (Progressing)       Start:  12/13/23    Expected End:  02/29/24       Patient will be independent with HEP within restrictions of protocol to improve ROM, strength, mobility of RUE for ADLS...ongoing

## 2024-02-26 ENCOUNTER — APPOINTMENT (OUTPATIENT)
Dept: PHYSICAL THERAPY | Facility: CLINIC | Age: 74
End: 2024-02-26
Payer: MEDICARE

## 2024-02-27 ENCOUNTER — TREATMENT (OUTPATIENT)
Dept: PHYSICAL THERAPY | Facility: CLINIC | Age: 74
End: 2024-02-27
Payer: MEDICARE

## 2024-02-27 DIAGNOSIS — Z96.611 S/P REVERSE TOTAL SHOULDER ARTHROPLASTY, RIGHT: ICD-10-CM

## 2024-02-27 DIAGNOSIS — M25.611 SHOULDER STIFFNESS, RIGHT: ICD-10-CM

## 2024-02-27 DIAGNOSIS — M25.511 ACUTE PAIN OF RIGHT SHOULDER: ICD-10-CM

## 2024-02-27 PROCEDURE — 97110 THERAPEUTIC EXERCISES: CPT | Mod: GP | Performed by: PHYSICAL THERAPIST

## 2024-02-27 ASSESSMENT — PAIN SCALES - GENERAL: PAINLEVEL_OUTOF10: 0 - NO PAIN

## 2024-02-27 ASSESSMENT — PAIN - FUNCTIONAL ASSESSMENT: PAIN_FUNCTIONAL_ASSESSMENT: 0-10

## 2024-02-27 NOTE — PROGRESS NOTES
Physical Therapy    Physical Therapy Treatment    Patient Name: Dionicio Valdez  MRN: [id number]  Today's Date: 2/27/2024  Time Calculation  Start Time: 1505  Stop Time: 1550  Time Calculation (min): 45 min      Assessment:  Patient has attended 15 visits of outpatient PT from initial evaluation on 12/13/23 thru today.  He contiues to make good progress with intervention.  Today's session emphasis was on scapular and shoulder stabilization exercises.  Patient cotninues to be weak in shoulder with antigravity AROM /strength.  He also would benefit from continuing to strengthen his periscapular muscles as they quickly fatigue as well.  Patient reports overall great improvement with Rom and reaching for things/completing ADLs.  He continues to have difficulty with reaching behind his back and with active elevation above 100deg due to weakness.  He wouuld benefit from continuing skilled intervention to further improve AROm, strength and functional use of RUE (dominant arm/hand).  Quick DASH indicates 30% impairement of R UE.  Plan to request 2x/weeke for additional 8 visits but will need insurance authorization.  All goals in progress.  PT Assessment  PT Assessment Results: Decreased strength, Decreased range of motion, Decreased endurance, Pain  Rehab Prognosis: Good  Plan:  OP PT Plan  Treatment/Interventions: Cryotherapy, Education/ Instruction, Hot pack, Manual therapy, Neuromuscular re-education, Self care/ home management, Therapeutic exercises  PT Plan: Skilled PT  PT Frequency: 2 times per week  Onset Date: 11/16/23  Number of Treatments Authorized: need new auth  Rehab Potential: Good  Plan of Care Agreement: Patient    Current Problem  1. S/P reverse total shoulder arthroplasty, right  Follow Up In Physical Therapy      2. Acute pain of right shoulder  Follow Up In Physical Therapy      3. Shoulder stiffness, right  Follow Up In Physical Therapy          General  PT  Visit  PT Received On:  "02/27/24  General  Reason for Referral: acute pain right shoulder, s/p reverse total shoulder arthroplasty, right  Referred By: Rylee Northeim PA, oliger is surgeon  Past Medical History Relevant to Rehab: healthy, arthritis    Subjective    Precautions  Precautions  Post-Surgical Precautions: Right shoulder precautions  Vital Signs     Pain  Pain Assessment  Pain Assessment: 0-10  Pain Score: 0 - No pain    Objective     AROM supine: shoulder [id number], ER and IR at 45 deg abd=40deg  Seated AROM shoulder flexion 107, abd=107  Strength: shoulder flex=4-/5 in available range, abd3+/5 avail range  IR R shoulder: able to reach lateral hip but unable to reach back pocket  Cognition   WNL     Outcome Measures:  Other Measures  Disability of Arm Shoulder Hand (DASH): 30%  Treatments:    Therapeutic Exercise  Therapeutic Exercise Performed: Yes  Pulleys forward elevation 3' x  UBE 3' fwd 3' bwd Level 3   -Rhythmic stabilization: supine shoulder flex 30,60,90,120deg and protraction 20\" ea with mod perturbations at elbow, second set 20\" ea mod perturbations mid forearm  -Wall push up with plus x10 and I/S for Hep  -Forearm plank 10\", 8\" with cues to protract scapula (to stop wingning)  -Quadruped mod pertubatios to trunk with shoulder protraction (no winging) 20\"x3  -Quadruped, L arm flexion /unweighted and mod perturbations to L arm to stabilize on R 20\" x3 (no winging)  -Ball on wall play ball x20 4# flex/ext,lat,CW,CCW    T-band active (progress from walk outs)  -ER, IR, flex, ext green band 3 x 10 ea   Wall slide flex/scap x10 10\" hold (X)  Stabilize and reach   Rows 2 x 10 (X) reviewed for HEP   ER stretch mild tension 3 x 10\"  Door stretch 3 ways 3 x 10\" cues to perform writhen tolerance   Prone   -Rows 2x10 2#  -abd 2x10 2#   -scap 2x10 2#  -ext 2x10 2#  S/L ER 2 x 10 2#   S/L scap retraction with abd x10   S/L scap retraction with flex x10   Seated B ER no resistance x10 2\" hold      Not today  Manual " Therapy  Manual Therapy Performed: Yes  IASTM/STM/manual stretching: right pectoralis lateral border, under axilla anteriorly and posteriorly, deltoids, medial scap border (emphasis   Scar mobilization and review  PROM completed.   Scap mobs completed     OP EDUCATION:  Outpatient Education  Individual(s) Educated: Patient  Diagnosis and Precautions: R shoulder stiffness, pain, s/p R reverse shoulder 11/16/23, see above prec  Risk and Benefits Discussed with Patient/Caregiver/Other: yes  Patient/Caregiver Demonstrated Understanding: yes  Plan of Care Discussed and Agreed Upon: yes  Patient Response to Education: Patient/Caregiver Verbalized Understanding of Information, Patient/Caregiver Performed Return Demonstration of Exercises/Activities    Goals:  Active       PT Problem       PT Goal 1 (Met)       Start:  12/13/23    Expected End:  12/27/23    Resolved:  01/31/24    Patient will have AROM right elbow 0-135 deg without compensation/ease and no pain [id number].  Patient will have WNL forearm pronation and supination without pain/discomfort/with ease         PT Goal 2 (Progressing)       Start:  12/13/23    Expected End:  02/29/24       -Patient will have improved AAROM R shoulder to 120 elevation (scaption/flexion) with ease and no discomfort in sitting in order to easily feed self, wash hair, brush teeth - MET 2/27/24  -Patient will have WNL shoulder extension in sitting (AROM) without discomfort/with ease to nav/doff jacket..  MET  -Patient will have quick DASH score of 25% or less to improve ADL tolerance  PROGRESSING, 30% 2/27/24  -Patient will be able to sleep in bed with only 1 sleep interruption per night x1 week  PROGRESSING 2/27/24 patient reports this is finally getting better             PT Goal 3 (Progressing)       Start:  12/13/23    Expected End:  02/29/24       -Patient will have AROM R shoulder flexion and scaption to 120 deg without pain/discomfort /with ease to perform overhead  activities and ADLS  PROGRESSING 2/27/24, has 107 deg  -Patient will be able to nav/doff upper body garments with ease and without discomfort in normal fashion... PROGRESSING 2/27/24  -Patient will have quick DASH score of 15% or less to improve ADL tolerance  PROGRESSING is 30% as of 2/27/24  -patient will have no sleep interruptions in bed x1 week  PROGRESSING 2/27/24         PT Goal 4 (Progressing)       Start:  12/13/23    Expected End:  02/29/24       Patient will be independent with HEP within restrictions of protocol to improve ROM, strength, mobility of RUE for ADLS...ongoing   PROGRESSING

## 2024-02-29 ENCOUNTER — TRANSCRIBE ORDERS (OUTPATIENT)
Dept: PHYSICAL THERAPY | Facility: CLINIC | Age: 74
End: 2024-02-29
Payer: MEDICARE

## 2024-02-29 DIAGNOSIS — M25.611 SHOULDER STIFFNESS, RIGHT: Primary | ICD-10-CM

## 2024-03-04 ENCOUNTER — TREATMENT (OUTPATIENT)
Dept: PHYSICAL THERAPY | Facility: CLINIC | Age: 74
End: 2024-03-04
Payer: MEDICARE

## 2024-03-04 DIAGNOSIS — M25.611 SHOULDER STIFFNESS, RIGHT: ICD-10-CM

## 2024-03-04 PROCEDURE — 97110 THERAPEUTIC EXERCISES: CPT | Mod: GP,CQ

## 2024-03-04 ASSESSMENT — PAIN - FUNCTIONAL ASSESSMENT: PAIN_FUNCTIONAL_ASSESSMENT: 0-10

## 2024-03-04 ASSESSMENT — PAIN SCALES - GENERAL: PAINLEVEL_OUTOF10: 2

## 2024-03-04 NOTE — PROGRESS NOTES
"Physical Therapy Treatment    Patient Name: Dionicio Valdez  MRN: [id number]  Today's Date: 3/4/2024  Time Calculation  Start Time: 1358  Stop Time: 1444  Time Calculation (min): 46 min  PT Therapeutic Procedures Time Entry  Manual Therapy Time Entry: 3  Therapeutic Exercise Time Entry: 40       Assessment:   Patient identified by name and date of birth. Patient demonstrated quick fatigue with scapular strengthening. He demonstrated improved ease with PROM this date verses previous visit with this therapist.     Plan:  OP PT Plan  Treatment/Interventions: Cryotherapy, Education/ Instruction, Hot pack, Manual therapy, Neuromuscular re-education, Self care/ home management, Therapeutic exercises  PT Plan: Skilled PT  PT Frequency: 2 times per week  Onset Date: 11/16/23  Number of Treatments Authorized: need new auth  Rehab Potential: Good  Plan of Care Agreement: Patient    Continue with progression of shoulder and scapular strengthening for increased ease with ADL's. AW  Current Problem  Problem List Items Addressed This Visit             ICD-10-CM    Shoulder stiffness, right M25.611       Subjective Patient reported compliance with % of the time and verbalized understanding.  Patient reported he was out raking leaves this AM and feels that is why he is experiencing Sx. Patient reported 0/10 pain after treatment.   General  Reason for Referral: acute pain right shoulder, s/p reverse total shoulder arthroplasty, right  Referred By: Rylee Northeim PA, oliger is surgeon  Past Medical History Relevant to Rehab: healthy, arthritis  Precautions  Precautions  Post-Surgical Precautions: Right shoulder precautions    Pain  Pain Assessment: 0-10  Pain Score: 2  Pain Location: Shoulder     Treatments:  Therapeutic Exercise  Therapeutic Exercise Performed: Yes    UBE 3' fwd 3' bwd Level 3   Ball on wall play ball x30 4# flex/ext,lat,CW,CCW  Wall push up with plus 2x10 and I/S   Forearm plank 2 x 15\" (no winging)  Quadruped " "mod pertubatios to trunk with shoulder protraction (no winging) 20\"x3  Quadruped, L arm flexion /unweighted and mod perturbations to L arm to stabilize on R 20\" x3 (no winging)  Cat camel x10   BOSU on elevated plinth rocks flex/ext,lat, CCW,CW x10 (N)  Prone I's, Y's, T's, x10 3\" hold (N)   Prone   -Rows 2x10 3#  -abd 2x10 3#   -scap 2x10 2#  -ext 2x10 3#  Rhythmic stabilization: supine shoulder flex 30,60,90,120deg and protraction 20\" ea with mod perturbations at elbow, second set 20\" ea mod perturbations mid forearm    T-band active (progress from walk outs)  -ER, IR, flex, ext green band 3 x 10 ea   Wall slide flex/scap x10 10\" hold (X)  Stabilize and reach   Rows 2 x 10 (X) reviewed for HEP   ER stretch mild tension 3 x 10\"  Door stretch 3 ways 3 x 10\" cues to perform writhen tolerance   S/L ER 2 x 10 2#   S/L scap retraction with abd x10   S/L scap retraction with flex x10   Seated B ER no resistance x10 2\" hold    Pulleys forward elevation 3' x    Manual Therapy  Manual Therapy Performed: Yes   IASTM/STM/manual stretching: right pectoralis lateral border, under axilla anteriorly and posteriorly, deltoids, medial scap border (emphasis   Scar mobilization and review  PROM completed.   Scap mobs completed   OP EDUCATION:     Access Code: [email or web address]  URL: [email or web address]  Date: 03/04/2024  Prepared by: Arlene Galeano    Exercises  - Prone Scapular Slide with Shoulder Extension  - 1 x daily - 7 x weekly - 2 sets - 10 reps - 3-5 hold  - Prone Scapular Retraction Arms at Side  - 1 x daily - 7 x weekly - 2 sets - 10 reps - 3-5 hold  - Prone Scapular Retraction Y  - 1 x daily - 7 x weekly - 2 sets - 10 reps - 3-5 holdAccess Code: [email or web address]  URL: [email or web address]  Date: 02/23/2024  Prepared by: Arlene Galeano     Exercises  - Doorway Pec Stretch at 60 Elevation  - 1 x daily - 7 x weekly - 3 sets - 10 reps  - Doorway Pec Stretch at 90 Degrees Abduction  - 1 x daily - " 7 x weekly - 3 sets - 10 reps  Access Code: [email or web address]  URL: [email or web address]  Date: 02/14/2024  Prepared by: Arlene Galeano     Exercises  - Prone Single Arm Shoulder Y  - 1 x daily - 7 x weekly - 2 sets - 10 reps  - Prone Shoulder Horizontal Abduction  - 1 x daily - 7 x weekly - 2 sets - 10 reps  - Prone Shoulder Extension - Single Arm  - 1 x daily - 7 x weekly - 2 sets - 10 reps  - Prone Shoulder Row  - 1 x daily - 7 x weekly - 2 sets - 10 reps  - Standing Bilateral Low Shoulder Row with Anchored Resistance  - 1 x daily - 7 x weekly - 2 sets - 10 reps  - Shoulder External Rotation and Scapular Retraction  - 1 x daily - 7 x weekly - 2 sets - 10 reps  - Standing Shoulder External Rotation Stretch in Doorway  - 1 x daily - 7 x weekly - 1 sets - 3 reps - 10 hold   Outpatient Education  Individual(s) Educated: Patient, Spouse  Education Provided: Home Exercise Program  Diagnosis and Precautions: R shoulder stiffness, pain, s/p R reverse shoulder 11/16/23, see above prec  Patient Response to Education: Patient/Caregiver Verbalized Understanding of Information, Patient/Caregiver Performed Return Demonstration of Exercises/Activities  Education Comment: add heat under arm/axilla f/b shoulder stretching, add shoulder flexion stretch leaning forward on table   Access Code: [email or web address]  URL: [email or web address]  Date: 01/26/2024  Prepared by: Arlene Galeano     Exercises  - Shoulder External Rotation Reactive Isometrics  - 1 x daily - 7 x weekly - 2 sets - 10 reps  - Shoulder Internal Rotation Reactive Isometrics  - 1 x daily - 7 x weekly - 2 sets - 10 reps  - Standing Shoulder Flexion Reactive Isometric  - 1 x daily - 7 x weekly - 2 sets - 10 reps  - Standing Shoulder Row Reactive Isometric  - 1 x daily - 7 x weekly - 2 sets - 10 reps  - Sidelying Shoulder External Rotation  - 1 x daily - 7 x weekly - 2 sets - 10 reps  - Shoulder Flexion Wall Slide with Towel  - 1 x daily - 7  x weekly - 2 sets - 10 reps - 3-5 hold  - Scaption Wall Slide with Towel  - 1 x daily - 7 x weekly - 2 sets - 10 reps - 3-5 hold  Access Code: [email or web address]  URL: [email or web address]  Date: 01/08/2024  Prepared by: Arleen Galeano     Exercises  - Seated Upper Trapezius Stretch  - 1 x daily - 7 x weekly - 1 sets - 3 reps - 20-30 hold  - Gentle Levator Scapulae Stretch  - 1 x daily - 7 x weekly - 1 sets - 3 reps - 20-30 hold  - Standing Isometric Shoulder Internal Rotation at Doorway  - 1 x daily - 7 x weekly - 2 sets - 10 reps - 3-5 hold  - Standing Isometric Shoulder External Rotation with Doorway  - 1 x daily - 7 x weekly - 2 sets - 10 reps - 3-5 hold  - Standing Isometric Shoulder Flexion with Doorway - Arm Bent  - 1 x daily - 7 x weekly - 2 sets - 10 reps - 3-5 hold  - Standing Bicep Curls Supinated with Dumbbells  - 1 x daily - 7 x weekly - 2 sets - 10 reps  - Seated Wrist Flexion with Dumbbell  - 1 x daily - 7 x weekly - 2 sets - 10 reps  - Seated Wrist Extension with Dumbbell  - 1 x daily - 7 x weekly - 2 sets - 10 reps  Not today: 12-18-23:   Access Code: [email or web address]  URL: [email or web address]  Date: 12/13/2023  Prepared by: Abby Infante     Exercises  - Seated Scapular Retraction  - 4 x daily - 7 x weekly - 1 sets - 10 reps  - Seated Backward Shoulder Rolls  - 4 x daily - 7 x weekly - 1 sets - 10 reps  - Supine Elbow Extension Stretch with Weight  - 2 x daily - 7 x weekly - 2 sets - 2 reps - 2-3 min hold  - Forearm Pronation and Supination with Hammer  - 1 x daily - 7 x weekly - 3 sets - 10 reps - 10 hold  - Supine Shoulder Flexion Extension AAROM with Dowel  - 2 x daily - 7 x weekly - 1 sets - 10 reps  - Supine Shoulder Abduction AAROM with Dowel  - 2 x daily - 7 x weekly - 1 sets - 10 reps  - Supine Shoulder Press with Dowel  - 2 x daily - 7 x weekly - 1 sets - 10 reps  - Supine Shoulder External Rotation with Dowel  - 2 x daily - 7 x weekly - 1 sets - 10 reps        Goals:  Active       PT Problem       PT Goal 1 (Met)       Start:  12/13/23    Expected End:  12/27/23    Resolved:  01/31/24    Patient will have AROM right elbow 0-135 deg without compensation/ease and no pain [id number].  Patient will have WNL forearm pronation and supination without pain/discomfort/with ease         PT Goal 2 (Progressing)       Start:  12/13/23    Expected End:  02/29/24       -Patient will have improved AAROM R shoulder to 120 elevation (scaption/flexion) with ease and no discomfort in sitting in order to easily feed self, wash hair, brush teeth - MET 2/27/24  -Patient will have WNL shoulder extension in sitting (AROM) without discomfort/with ease to nav/doff jacket..  MET  -Patient will have quick DASH score of 25% or less to improve ADL tolerance  PROGRESSING, 30% 2/27/24  -Patient will be able to sleep in bed with only 1 sleep interruption per night x1 week  PROGRESSING 2/27/24 patient reports this is finally getting better             PT Goal 3 (Progressing)       Start:  12/13/23    Expected End:  02/29/24       -Patient will have AROM R shoulder flexion and scaption to 120 deg without pain/discomfort /with ease to perform overhead activities and ADLS  PROGRESSING 2/27/24, has 107 deg  -Patient will be able to nav/doff upper body garments with ease and without discomfort in normal fashion... PROGRESSING 2/27/24  -Patient will have quick DASH score of 15% or less to improve ADL tolerance  PROGRESSING is 30% as of 2/27/24  -patient will have no sleep interruptions in bed x1 week  PROGRESSING 2/27/24         PT Goal 4 (Progressing)       Start:  12/13/23    Expected End:  02/29/24       Patient will be independent with HEP within restrictions of protocol to improve ROM, strength, mobility of RUE for ADLS...ongoing   PROGRESSING

## 2024-03-06 ENCOUNTER — TREATMENT (OUTPATIENT)
Dept: PHYSICAL THERAPY | Facility: CLINIC | Age: 74
End: 2024-03-06
Payer: MEDICARE

## 2024-03-06 DIAGNOSIS — M25.611 SHOULDER STIFFNESS, RIGHT: ICD-10-CM

## 2024-03-06 PROCEDURE — 97140 MANUAL THERAPY 1/> REGIONS: CPT | Mod: GP,CQ

## 2024-03-06 PROCEDURE — 97110 THERAPEUTIC EXERCISES: CPT | Mod: GP,CQ

## 2024-03-06 ASSESSMENT — PAIN SCALES - GENERAL: PAINLEVEL_OUTOF10: 0 - NO PAIN

## 2024-03-06 ASSESSMENT — PAIN - FUNCTIONAL ASSESSMENT: PAIN_FUNCTIONAL_ASSESSMENT: 0-10

## 2024-03-06 NOTE — PROGRESS NOTES
"Physical Therapy Treatment    Patient Name: Dionicio Valdez  MRN: [id number]  Today's Date: 3/6/2024  Time Calculation  Start Time: 1530  Stop Time: 1615  Time Calculation (min): 45 min  PT Therapeutic Procedures Time Entry  Manual Therapy Time Entry: 11  Therapeutic Exercise Time Entry: 32       Assessment:   Patient identified by name and date of birth. Patient demonstrated quick fatigue with addition of stabilize and reach. Patient presented with 155 flex this date in supine with AAROM. He demonstrated overall fatigue with reduction of \"winging with exercises this date.    Plan:  OP PT Plan  Treatment/Interventions: Cryotherapy, Education/ Instruction, Hot pack, Manual therapy, Neuromuscular re-education, Self care/ home management, Therapeutic exercises  PT Plan: Skilled PT  PT Frequency: 2 times per week  Onset Date: 11/16/23  Number of Treatments Authorized: need new auth  Rehab Potential: Good  Plan of Care Agreement: Patient  Continue with progression of strengthening and ROM for increased ease with overhead lifting.  Current Problem  Problem List Items Addressed This Visit             ICD-10-CM    Shoulder stiffness, right M25.611       Subjective Patient reported compliance with % of the time and verbalized understanding.  Patient reported 0/10 pain after treatment.   General  Reason for Referral: acute pain right shoulder, s/p reverse total shoulder arthroplasty, right  Referred By: Rylee Northeim PA, oliger is surgeon  Past Medical History Relevant to Rehab: healthy, arthritis  Precautions  Precautions  Post-Surgical Precautions: Right shoulder precautions    Pain  Pain Assessment: 0-10  Pain Score: 0 - No pain  Pain Location: Shoulder     Treatments:  Therapeutic Exercise  Therapeutic Exercise Performed: Yes  UBE 3' fwd 3' bwd Level 3   Ball on wall play ball x30 4# flex/ext,lat,CW,CCW  Wall push up with plus 2x10 and I/S   Stabilize and reach x 10 yellow loop band (N)  Forearm plank 2 x 20\" (no " "winging)  Quadruped mod pertubatios to trunk with shoulder protraction (no winging) 20\"x3 (X)  Quadruped, L arm flexion /unweighted and mod perturbations to L arm to stabilize on R 30\" x3 (no winging)  Cat camel x10 (X)  BOSU on elevated plinth rocks flex/ext,lat, CCW,CW x15 (P)  Prone I's, Y's, T's, x10 3\" hold    Prone   -Rows 15 3#  -abd 15 3#   -scap 15 3#  -ext 15 3#  Not this date;   Rhythmic stabilization: supine shoulder flex 30,60,90,120deg and protraction 20\" ea with mod perturbations at elbow, second set 20\" ea mod perturbations mid forearm   T-band active (progress from walk outs)  -ER, IR, flex, ext green band 3 x 10 ea   Wall slide flex/scap x10 10\" hold (X)  Stabilize and reach   Rows 2 x 10 (X) reviewed for HEP   ER stretch mild tension 3 x 10\"  Door stretch 3 ways 3 x 10\" cues to perform writhen tolerance   S/L ER 2 x 10 2#   S/L scap retraction with abd x10   S/L scap retraction with flex x10   Seated B ER no resistance x10 2\" hold    Pulleys forward elevation 3' x  Manual Therapy  Manual Therapy Performed: Yes   IASTM/STM/manual stretching: right pectoralis lateral border, under axilla anteriorly and posteriorly, deltoids, medial scap border (emphasis   Scar mobilization and review  PROM completed.   Scap mobs completed   OP EDUCATION:   Access Code: [email or web address]  URL: [email or web address]  Date: 03/04/2024  Prepared by: Arlene Galeano     Exercises  - Prone Scapular Slide with Shoulder Extension  - 1 x daily - 7 x weekly - 2 sets - 10 reps - 3-5 hold  - Prone Scapular Retraction Arms at Side  - 1 x daily - 7 x weekly - 2 sets - 10 reps - 3-5 hold  - Prone Scapular Retraction Y  - 1 x daily - 7 x weekly - 2 sets - 10 reps - 3-5 holdAccess Code: [email or web address]  URL: [email or web address]  Date: 02/23/2024  Prepared by: Arlene Walker     Exercises  - Doorway Pec Stretch at 60 Elevation  - 1 x daily - 7 x weekly - 3 sets - 10 reps  - Doorway Pec Stretch at 90 " Degrees Abduction  - 1 x daily - 7 x weekly - 3 sets - 10 reps  Access Code: [email or web address]  URL: [email or web address]  Date: 02/14/2024  Prepared by: Arlene Galeano     Exercises  - Prone Single Arm Shoulder Y  - 1 x daily - 7 x weekly - 2 sets - 10 reps  - Prone Shoulder Horizontal Abduction  - 1 x daily - 7 x weekly - 2 sets - 10 reps  - Prone Shoulder Extension - Single Arm  - 1 x daily - 7 x weekly - 2 sets - 10 reps  - Prone Shoulder Row  - 1 x daily - 7 x weekly - 2 sets - 10 reps  - Standing Bilateral Low Shoulder Row with Anchored Resistance  - 1 x daily - 7 x weekly - 2 sets - 10 reps  - Shoulder External Rotation and Scapular Retraction  - 1 x daily - 7 x weekly - 2 sets - 10 reps  - Standing Shoulder External Rotation Stretch in Doorway  - 1 x daily - 7 x weekly - 1 sets - 3 reps - 10 hold   Outpatient Education  Individual(s) Educated: Patient, Spouse  Education Provided: Home Exercise Program  Diagnosis and Precautions: R shoulder stiffness, pain, s/p R reverse shoulder 11/16/23, see above prec  Patient Response to Education: Patient/Caregiver Verbalized Understanding of Information, Patient/Caregiver Performed Return Demonstration of Exercises/Activities  Education Comment: add heat under arm/axilla f/b shoulder stretching, add shoulder flexion stretch leaning forward on table   Access Code: [email or web address]  URL: [email or web address]  Date: 01/26/2024  Prepared by: Arlene Galeano     Exercises  - Shoulder External Rotation Reactive Isometrics  - 1 x daily - 7 x weekly - 2 sets - 10 reps  - Shoulder Internal Rotation Reactive Isometrics  - 1 x daily - 7 x weekly - 2 sets - 10 reps  - Standing Shoulder Flexion Reactive Isometric  - 1 x daily - 7 x weekly - 2 sets - 10 reps  - Standing Shoulder Row Reactive Isometric  - 1 x daily - 7 x weekly - 2 sets - 10 reps  - Sidelying Shoulder External Rotation  - 1 x daily - 7 x weekly - 2 sets - 10 reps  - Shoulder Flexion Wall  Slide with Towel  - 1 x daily - 7 x weekly - 2 sets - 10 reps - 3-5 hold  - Scaption Wall Slide with Towel  - 1 x daily - 7 x weekly - 2 sets - 10 reps - 3-5 hold  Access Code: [email or web address]  URL: [email or web address]  Date: 01/08/2024  Prepared by: Arlene Galeano     Exercises  - Seated Upper Trapezius Stretch  - 1 x daily - 7 x weekly - 1 sets - 3 reps - 20-30 hold  - Gentle Levator Scapulae Stretch  - 1 x daily - 7 x weekly - 1 sets - 3 reps - 20-30 hold  - Standing Isometric Shoulder Internal Rotation at Doorway  - 1 x daily - 7 x weekly - 2 sets - 10 reps - 3-5 hold  - Standing Isometric Shoulder External Rotation with Doorway  - 1 x daily - 7 x weekly - 2 sets - 10 reps - 3-5 hold  - Standing Isometric Shoulder Flexion with Doorway - Arm Bent  - 1 x daily - 7 x weekly - 2 sets - 10 reps - 3-5 hold  - Standing Bicep Curls Supinated with Dumbbells  - 1 x daily - 7 x weekly - 2 sets - 10 reps  - Seated Wrist Flexion with Dumbbell  - 1 x daily - 7 x weekly - 2 sets - 10 reps  - Seated Wrist Extension with Dumbbell  - 1 x daily - 7 x weekly - 2 sets - 10 reps  Not today: 12-18-23:   Access Code: [email or web address]  URL: [email or web address]  Date: 12/13/2023  Prepared by: Abby Infante     Exercises  - Seated Scapular Retraction  - 4 x daily - 7 x weekly - 1 sets - 10 reps  - Seated Backward Shoulder Rolls  - 4 x daily - 7 x weekly - 1 sets - 10 reps  - Supine Elbow Extension Stretch with Weight  - 2 x daily - 7 x weekly - 2 sets - 2 reps - 2-3 min hold  - Forearm Pronation and Supination with Hammer  - 1 x daily - 7 x weekly - 3 sets - 10 reps - 10 hold  - Supine Shoulder Flexion Extension AAROM with Dowel  - 2 x daily - 7 x weekly - 1 sets - 10 reps  - Supine Shoulder Abduction AAROM with Dowel  - 2 x daily - 7 x weekly - 1 sets - 10 reps  - Supine Shoulder Press with Dowel  - 2 x daily - 7 x weekly - 1 sets - 10 reps  - Supine Shoulder External Rotation with Dowel  - 2 x daily -  7 x weekly - 1 sets - 10 reps       Goals:  Active       PT Problem       PT Goal 1 (Met)       Start:  12/13/23    Expected End:  12/27/23    Resolved:  01/31/24    Patient will have AROM right elbow 0-135 deg without compensation/ease and no pain [id number].  Patient will have WNL forearm pronation and supination without pain/discomfort/with ease         PT Goal 2 (Progressing)       Start:  12/13/23    Expected End:  02/29/24       -Patient will have improved AAROM R shoulder to 120 elevation (scaption/flexion) with ease and no discomfort in sitting in order to easily feed self, wash hair, brush teeth - MET 2/27/24  -Patient will have WNL shoulder extension in sitting (AROM) without discomfort/with ease to nav/doff jacket..  MET  -Patient will have quick DASH score of 25% or less to improve ADL tolerance  PROGRESSING, 30% 2/27/24  -Patient will be able to sleep in bed with only 1 sleep interruption per night x1 week  PROGRESSING 2/27/24 patient reports this is finally getting better             PT Goal 3 (Progressing)       Start:  12/13/23    Expected End:  02/29/24       -Patient will have AROM R shoulder flexion and scaption to 120 deg without pain/discomfort /with ease to perform overhead activities and ADLS  PROGRESSING 2/27/24, has 107 deg  -Patient will be able to nav/doff upper body garments with ease and without discomfort in normal fashion... PROGRESSING 2/27/24  -Patient will have quick DASH score of 15% or less to improve ADL tolerance  PROGRESSING is 30% as of 2/27/24  -patient will have no sleep interruptions in bed x1 week  PROGRESSING 2/27/24         PT Goal 4 (Progressing)       Start:  12/13/23    Expected End:  02/29/24       Patient will be independent with HEP within restrictions of protocol to improve ROM, strength, mobility of RUE for ADLS...ongoing   PROGRESSING

## 2024-03-11 ENCOUNTER — TREATMENT (OUTPATIENT)
Dept: PHYSICAL THERAPY | Facility: CLINIC | Age: 74
End: 2024-03-11
Payer: MEDICARE

## 2024-03-11 DIAGNOSIS — M25.611 SHOULDER STIFFNESS, RIGHT: ICD-10-CM

## 2024-03-11 PROCEDURE — 97140 MANUAL THERAPY 1/> REGIONS: CPT | Mod: GP,CQ

## 2024-03-11 PROCEDURE — 97110 THERAPEUTIC EXERCISES: CPT | Mod: GP,CQ

## 2024-03-11 ASSESSMENT — PAIN SCALES - GENERAL: PAINLEVEL_OUTOF10: 1

## 2024-03-11 ASSESSMENT — PAIN - FUNCTIONAL ASSESSMENT: PAIN_FUNCTIONAL_ASSESSMENT: 0-10

## 2024-03-11 NOTE — PROGRESS NOTES
"Physical Therapy Treatment    Patient Name: Dionicio Valdez  MRN: [id number]  Today's Date: 3/11/2024  Time Calculation  Start Time: 1357  Stop Time: 1444  Time Calculation (min): 47 min  PT Therapeutic Procedures Time Entry  Manual Therapy Time Entry: 21  Therapeutic Exercise Time Entry: 24       Assessment:   Patient identified by name and date of birth. Focused on  Patient presented with palpable adhesions in upper arm bicep/and pec muscle that responded well to addition of cupping/IASTM. He required cues for form with prone I's,Y's,T's and to maintain hold times.     Plan:  OP PT Plan  Treatment/Interventions: Cryotherapy, Education/ Instruction, Hot pack, Manual therapy, Neuromuscular re-education, Self care/ home management, Therapeutic exercises  PT Plan: Skilled PT  PT Frequency: 2 times per week  Onset Date: 11/16/23  Number of Treatments Authorized: need new auth  Rehab Potential: Good  Plan of Care Agreement: Patient  Continue with progression of ROM and strengthening with manual as needed.    Current Problem  Problem List Items Addressed This Visit             ICD-10-CM    Shoulder stiffness, right M25.611       Subjective Patient reported compliance with % of the time and verbalized understanding.  Patient reported 2/10 pain/soreness after treatment.   General  Reason for Referral: acute pain right shoulder, s/p reverse total shoulder arthroplasty, right  Referred By: Rylee Northeim PA, oliger is surgeon  Past Medical History Relevant to Rehab: healthy, arthritis  Precautions  Precautions  Post-Surgical Precautions: Right shoulder precautions    Pain  Pain Assessment: 0-10  Pain Score: 1  Pain Location: Shoulder  Pain Orientation: Right     Treatments:  Therapeutic Exercise  Therapeutic Exercise Performed: Yes  UBE 3' fwd 3' bwd Level 3   Ball on wall play ball x30 4# flex/ext,lat,CW,CCW  Wall push up with plus 2x10 and I/S (X)  Stabilize and reach 2 x 10 green loop band (P)  Forearm plank 2 x 30\" " "(no winging)  Quadruped mod pertubatios to trunk with shoulder protraction (no winging) 20\"x3 (X)  Quadruped, L arm flexion /unweighted and mod perturbations to L arm to stabilize on R 30\" x3 (no winging)  Cat camel x10 (X)  BOSU on elevated plinth rocks flex/ext,lat, CCW,CW x15 (X)  Prone I's, Y's, T's, x15 3\" hold   IR strap stretch 10\" hold x10  Not this date;    Prone   -Rows 15 3#  -abd 15 3#   -scap 15 3#  -ext 15 3#    Rhythmic stabilization: supine shoulder flex 30,60,90,120deg and protraction 20\" ea with mod perturbations at elbow, second set 20\" ea mod perturbations mid forearm   T-band active (progress from walk outs)  -ER, IR, flex, ext green band 3 x 10 ea   Wall slide flex/scap x10 10\" hold (X)  Stabilize and reach   Rows 2 x 10 (X) reviewed for HEP   ER stretch mild tension 3 x 10\"  Door stretch 3 ways 3 x 10\" cues to perform writhen tolerance   S/L ER 2 x 10 2#   S/L scap retraction with abd x10   S/L scap retraction with flex x10   Seated B ER no resistance x10 2\" hold    Pulleys forward elevation 3' x     Manual Therapy  Manual Therapy Performed: Yes   IASTM/STM/manual stretching: right pectoralis lateral border, under axilla anteriorly and posteriorly, deltoids, medial scap border   Cupping to bicep/pec static/dynamic movements (N)  Scar mobilization and review  PROM completed.   Scap mobs completed     OP EDUCATION:   Access Code: [email or web address]  URL: [email or web address]  Date: 03/04/2024  Prepared by: Arlene Galeano     Exercises  - Prone Scapular Slide with Shoulder Extension  - 1 x daily - 7 x weekly - 2 sets - 10 reps - 3-5 hold  - Prone Scapular Retraction Arms at Side  - 1 x daily - 7 x weekly - 2 sets - 10 reps - 3-5 hold  - Prone Scapular Retraction Y  - 1 x daily - 7 x weekly - 2 sets - 10 reps - 3-5 holdAccess Code: [email or web address]  URL: [email or web address]"/  Date: 02/23/2024  Prepared by: Arlene Galeano     Exercises  - Doorway Pec Stretch at 60 " Elevation  - 1 x daily - 7 x weekly - 3 sets - 10 reps  - Doorway Pec Stretch at 90 Degrees Abduction  - 1 x daily - 7 x weekly - 3 sets - 10 reps  Access Code: [email or web address]  URL: [email or web address]  Date: 02/14/2024  Prepared by: Arlene Galeano     Exercises  - Prone Single Arm Shoulder Y  - 1 x daily - 7 x weekly - 2 sets - 10 reps  - Prone Shoulder Horizontal Abduction  - 1 x daily - 7 x weekly - 2 sets - 10 reps  - Prone Shoulder Extension - Single Arm  - 1 x daily - 7 x weekly - 2 sets - 10 reps  - Prone Shoulder Row  - 1 x daily - 7 x weekly - 2 sets - 10 reps  - Standing Bilateral Low Shoulder Row with Anchored Resistance  - 1 x daily - 7 x weekly - 2 sets - 10 reps  - Shoulder External Rotation and Scapular Retraction  - 1 x daily - 7 x weekly - 2 sets - 10 reps  - Standing Shoulder External Rotation Stretch in Doorway  - 1 x daily - 7 x weekly - 1 sets - 3 reps - 10 hold   Outpatient Education  Individual(s) Educated: Patient, Spouse  Education Provided: Home Exercise Program  Diagnosis and Precautions: R shoulder stiffness, pain, s/p R reverse shoulder 11/16/23, see above prec  Patient Response to Education: Patient/Caregiver Verbalized Understanding of Information, Patient/Caregiver Performed Return Demonstration of Exercises/Activities  Education Comment: add heat under arm/axilla f/b shoulder stretching, add shoulder flexion stretch leaning forward on table   Access Code: [email or web address]  URL: [email or web address]  Date: 01/26/2024  Prepared by: Arlene Galeano     Exercises  - Shoulder External Rotation Reactive Isometrics  - 1 x daily - 7 x weekly - 2 sets - 10 reps  - Shoulder Internal Rotation Reactive Isometrics  - 1 x daily - 7 x weekly - 2 sets - 10 reps  - Standing Shoulder Flexion Reactive Isometric  - 1 x daily - 7 x weekly - 2 sets - 10 reps  - Standing Shoulder Row Reactive Isometric  - 1 x daily - 7 x weekly - 2 sets - 10 reps  - Sidelying Shoulder  External Rotation  - 1 x daily - 7 x weekly - 2 sets - 10 reps  - Shoulder Flexion Wall Slide with Towel  - 1 x daily - 7 x weekly - 2 sets - 10 reps - 3-5 hold  - Scaption Wall Slide with Towel  - 1 x daily - 7 x weekly - 2 sets - 10 reps - 3-5 hold  Access Code: [email or web address]  URL: [email or web address]  Date: 01/08/2024  Prepared by: Arlene Galeano     Exercises  - Seated Upper Trapezius Stretch  - 1 x daily - 7 x weekly - 1 sets - 3 reps - 20-30 hold  - Gentle Levator Scapulae Stretch  - 1 x daily - 7 x weekly - 1 sets - 3 reps - 20-30 hold  - Standing Isometric Shoulder Internal Rotation at Doorway  - 1 x daily - 7 x weekly - 2 sets - 10 reps - 3-5 hold  - Standing Isometric Shoulder External Rotation with Doorway  - 1 x daily - 7 x weekly - 2 sets - 10 reps - 3-5 hold  - Standing Isometric Shoulder Flexion with Doorway - Arm Bent  - 1 x daily - 7 x weekly - 2 sets - 10 reps - 3-5 hold  - Standing Bicep Curls Supinated with Dumbbells  - 1 x daily - 7 x weekly - 2 sets - 10 reps  - Seated Wrist Flexion with Dumbbell  - 1 x daily - 7 x weekly - 2 sets - 10 reps  - Seated Wrist Extension with Dumbbell  - 1 x daily - 7 x weekly - 2 sets - 10 reps  Not today: 12-18-23:   Access Code: [email or web address]  URL: [email or web address]  Date: 12/13/2023  Prepared by: Abby Infante   Exercises  - Seated Scapular Retraction  - 4 x daily - 7 x weekly - 1 sets - 10 reps  - Seated Backward Shoulder Rolls  - 4 x daily - 7 x weekly - 1 sets - 10 reps  - Supine Elbow Extension Stretch with Weight  - 2 x daily - 7 x weekly - 2 sets - 2 reps - 2-3 min hold  - Forearm Pronation and Supination with Hammer  - 1 x daily - 7 x weekly - 3 sets - 10 reps - 10 hold  - Supine Shoulder Flexion Extension AAROM with Dowel  - 2 x daily - 7 x weekly - 1 sets - 10 reps  - Supine Shoulder Abduction AAROM with Dowel  - 2 x daily - 7 x weekly - 1 sets - 10 reps  - Supine Shoulder Press with Dowel  - 2 x daily - 7 x  weekly - 1 sets - 10 reps  - Supine Shoulder External Rotation with Dowel  - 2 x daily - 7 x weekly - 1 sets - 10 reps       Goals:  Active       PT Problem       PT Goal 1 (Met)       Start:  12/13/23    Expected End:  12/27/23    Resolved:  01/31/24    Patient will have AROM right elbow 0-135 deg without compensation/ease and no pain [id number].  Patient will have WNL forearm pronation and supination without pain/discomfort/with ease         PT Goal 2 (Progressing)       Start:  12/13/23    Expected End:  02/29/24       -Patient will have improved AAROM R shoulder to 120 elevation (scaption/flexion) with ease and no discomfort in sitting in order to easily feed self, wash hair, brush teeth - MET 2/27/24  -Patient will have WNL shoulder extension in sitting (AROM) without discomfort/with ease to nav/doff jacket..  MET  -Patient will have quick DASH score of 25% or less to improve ADL tolerance  PROGRESSING, 30% 2/27/24  -Patient will be able to sleep in bed with only 1 sleep interruption per night x1 week  PROGRESSING 2/27/24 patient reports this is finally getting better             PT Goal 3 (Progressing)       Start:  12/13/23    Expected End:  02/29/24       -Patient will have AROM R shoulder flexion and scaption to 120 deg without pain/discomfort /with ease to perform overhead activities and ADLS  PROGRESSING 2/27/24, has 107 deg  -Patient will be able to nav/doff upper body garments with ease and without discomfort in normal fashion... PROGRESSING 2/27/24  -Patient will have quick DASH score of 15% or less to improve ADL tolerance  PROGRESSING is 30% as of 2/27/24  -patient will have no sleep interruptions in bed x1 week  PROGRESSING 2/27/24         PT Goal 4 (Progressing)       Start:  12/13/23    Expected End:  02/29/24       Patient will be independent with HEP within restrictions of protocol to improve ROM, strength, mobility of RUE for ADLS...ongoing   PROGRESSING

## 2024-03-13 ENCOUNTER — TREATMENT (OUTPATIENT)
Dept: PHYSICAL THERAPY | Facility: CLINIC | Age: 74
End: 2024-03-13
Payer: MEDICARE

## 2024-03-13 DIAGNOSIS — M25.611 SHOULDER STIFFNESS, RIGHT: ICD-10-CM

## 2024-03-13 PROCEDURE — 97140 MANUAL THERAPY 1/> REGIONS: CPT | Mod: GP,CQ

## 2024-03-13 PROCEDURE — 97110 THERAPEUTIC EXERCISES: CPT | Mod: GP,CQ

## 2024-03-13 ASSESSMENT — PAIN SCALES - GENERAL: PAINLEVEL_OUTOF10: 2

## 2024-03-13 ASSESSMENT — PAIN - FUNCTIONAL ASSESSMENT: PAIN_FUNCTIONAL_ASSESSMENT: 0-10

## 2024-03-13 NOTE — PROGRESS NOTES
Physical Therapy Treatment    Patient Name: Dionicio Valdez  MRN: [id number]  Today's Date: 3/13/2024  Time Calculation  Start Time: 1532  Stop Time: 1615  Time Calculation (min): 43 min  PT Therapeutic Procedures Time Entry  Manual Therapy Time Entry: 10  Therapeutic Exercise Time Entry: 31       Assessment:   Patient identified by name and date of birth. Patient demonstrated increased overall muscle fatigue this date verse previus visit. He demonstrated improved ROM this date   AROM supine: shoulder [id number], ER and IR at 45 deg abd=51 deg   Plan:  OP PT Plan  Treatment/Interventions: Cryotherapy, Education/ Instruction, Hot pack, Manual therapy, Neuromuscular re-education, Self care/ home management, Therapeutic exercises  PT Plan: Skilled PT  PT Frequency: 2 times per week  Onset Date: 11/16/23  Number of Treatments Authorized: need new auth  Rehab Potential: Good  Plan of Care Agreement: Patient    Continue with progression of strengthening and ROM with manual as needed for increased ease with reaching in his back pocket.   Current Problem  Problem List Items Addressed This Visit             ICD-10-CM    Shoulder stiffness, right M25.611       Subjective  Patient reported compliance with  % of the time and verbalized understanding.  He reported increased soreness after his last treatment. He reported 0/10 pain after treatment.   General  Reason for Referral: acute pain right shoulder, s/p reverse total shoulder arthroplasty, right  Referred By: Rylee Northeim PA, oliger is surgeon  Past Medical History Relevant to Rehab: healthy, arthritis  Precautions  Precautions  Post-Surgical Precautions: Right shoulder precautions    Pain  Pain Assessment: 0-10  Pain Score: 2  Pain Location: Shoulder  Pain Orientation: Right     Treatments:  Therapeutic Exercise  Therapeutic Exercise Performed: Yes  UBE 3' fwd 3' bwd Level 2  Ball on wall play ball x30 4# flex/ext,lat,CW,CCW  Wall push up with plus 2x10 and I/S  "  Stabilize and reach 2 x 10 green loop band (P)  Forearm plank 3 x 30\" (no winging)   Quadruped mod pertubatios to trunk with shoulder protraction (no winging) 20\"x3 (X)  Quadruped, L arm flexion /unweighted and mod perturbations to L arm to stabilize on R 30\" x3 (no winging)  Cat camel x10 (X)  BOSU on elevated plinth rocks flex/ext,lat, CCW,CW x15 (X)  Prone I's, Y's, T's, x15 3\" hold   IR strap stretch 10\" hold x10    Prone   -Rows 15 3#  -abd 15 3#   Not time this date  -scap 15 3#  -ext 15 3#     Rhythmic stabilization: supine shoulder flex 30,60,90,120deg and protraction 20\" ea with mod perturbations at elbow, second set 20\" ea mod perturbations mid forearm   T-band active (progress from walk outs)  -ER, IR, flex, ext green band 3 x 10 ea   Wall slide flex/scap x10 10\" hold (X)  Stabilize and reach   Rows 2 x 10 (X) reviewed for HEP   ER stretch mild tension 3 x 10\"  Door stretch 3 ways 3 x 10\" cues to perform writhen tolerance   S/L ER 2 x 10 2#   S/L scap retraction with abd x10   S/L scap retraction with flex x10   Seated B ER no resistance x10 2\" hold    Pulleys forward elevation 3' x    Manual Therapy  Manual Therapy Performed: Yes   IASTM/STM/manual stretching: right pectoralis lateral border, under axilla anteriorly and posteriorly, deltoids, medial scap border (STM only)  Cupping to bicep/pec static/dynamic movements (X)  Scar mobilization and review (X)  PROM completed.   Scap mobs completed   OP EDUCATION:   Access Code: [email or web address]  URL: [email or web address]  Date: 03/04/2024  Prepared by: Arlene Galeano     Exercises  - Prone Scapular Slide with Shoulder Extension  - 1 x daily - 7 x weekly - 2 sets - 10 reps - 3-5 hold  - Prone Scapular Retraction Arms at Side  - 1 x daily - 7 x weekly - 2 sets - 10 reps - 3-5 hold  - Prone Scapular Retraction Y  - 1 x daily - 7 x weekly - 2 sets - 10 reps - 3-5 holdAccess Code: [email or web address]  URL: " [email or web address]  Date: 02/23/2024  Prepared by: Arlene Galeano     Exercises  - Doorway Pec Stretch at 60 Elevation  - 1 x daily - 7 x weekly - 3 sets - 10 reps  - Doorway Pec Stretch at 90 Degrees Abduction  - 1 x daily - 7 x weekly - 3 sets - 10 reps  Access Code: [email or web address]  URL: [email or web address]  Date: 02/14/2024  Prepared by: Arlene Galeano     Exercises  - Prone Single Arm Shoulder Y  - 1 x daily - 7 x weekly - 2 sets - 10 reps  - Prone Shoulder Horizontal Abduction  - 1 x daily - 7 x weekly - 2 sets - 10 reps  - Prone Shoulder Extension - Single Arm  - 1 x daily - 7 x weekly - 2 sets - 10 reps  - Prone Shoulder Row  - 1 x daily - 7 x weekly - 2 sets - 10 reps  - Standing Bilateral Low Shoulder Row with Anchored Resistance  - 1 x daily - 7 x weekly - 2 sets - 10 reps  - Shoulder External Rotation and Scapular Retraction  - 1 x daily - 7 x weekly - 2 sets - 10 reps  - Standing Shoulder External Rotation Stretch in Doorway  - 1 x daily - 7 x weekly - 1 sets - 3 reps - 10 hold   Outpatient Education  Individual(s) Educated: Patient, Spouse  Education Provided: Home Exercise Program  Diagnosis and Precautions: R shoulder stiffness, pain, s/p R reverse shoulder 11/16/23, see above prec  Patient Response to Education: Patient/Caregiver Verbalized Understanding of Information, Patient/Caregiver Performed Return Demonstration of Exercises/Activities  Education Comment: add heat under arm/axilla f/b shoulder stretching, add shoulder flexion stretch leaning forward on table   Access Code: [email or web address]  URL: [email or web address]  Date: 01/26/2024  Prepared by: Arlene Galeano     Exercises  - Shoulder External Rotation Reactive Isometrics  - 1 x daily - 7 x weekly - 2 sets - 10 reps  - Shoulder Internal Rotation Reactive Isometrics  - 1 x daily - 7 x weekly - 2 sets - 10 reps  - Standing Shoulder Flexion Reactive Isometric  - 1 x daily - 7 x weekly  - 2 sets - 10 reps  - Standing Shoulder Row Reactive Isometric  - 1 x daily - 7 x weekly - 2 sets - 10 reps  - Sidelying Shoulder External Rotation  - 1 x daily - 7 x weekly - 2 sets - 10 reps  - Shoulder Flexion Wall Slide with Towel  - 1 x daily - 7 x weekly - 2 sets - 10 reps - 3-5 hold  - Scaption Wall Slide with Towel  - 1 x daily - 7 x weekly - 2 sets - 10 reps - 3-5 hold  Access Code: [email or web address]  URL: [email or web address]  Date: 01/08/2024  Prepared by: Arlene Galeano     Exercises  - Seated Upper Trapezius Stretch  - 1 x daily - 7 x weekly - 1 sets - 3 reps - 20-30 hold  - Gentle Levator Scapulae Stretch  - 1 x daily - 7 x weekly - 1 sets - 3 reps - 20-30 hold  - Standing Isometric Shoulder Internal Rotation at Doorway  - 1 x daily - 7 x weekly - 2 sets - 10 reps - 3-5 hold  - Standing Isometric Shoulder External Rotation with Doorway  - 1 x daily - 7 x weekly - 2 sets - 10 reps - 3-5 hold  - Standing Isometric Shoulder Flexion with Doorway - Arm Bent  - 1 x daily - 7 x weekly - 2 sets - 10 reps - 3-5 hold  - Standing Bicep Curls Supinated with Dumbbells  - 1 x daily - 7 x weekly - 2 sets - 10 reps  - Seated Wrist Flexion with Dumbbell  - 1 x daily - 7 x weekly - 2 sets - 10 reps  - Seated Wrist Extension with Dumbbell  - 1 x daily - 7 x weekly - 2 sets - 10 reps  Not today: 12-18-23:   Access Code: [email or web address]  URL: [email or web address]  Date: 12/13/2023  Prepared by: Abby Infante   Exercises  - Seated Scapular Retraction  - 4 x daily - 7 x weekly - 1 sets - 10 reps  - Seated Backward Shoulder Rolls  - 4 x daily - 7 x weekly - 1 sets - 10 reps  - Supine Elbow Extension Stretch with Weight  - 2 x daily - 7 x weekly - 2 sets - 2 reps - 2-3 min hold  - Forearm Pronation and Supination with Hammer  - 1 x daily - 7 x weekly - 3 sets - 10 reps - 10 hold  - Supine Shoulder Flexion Extension AAROM with Dowel  - 2 x daily - 7 x weekly - 1 sets - 10 reps  - Supine Shoulder  Abduction AAROM with Dowel  - 2 x daily - 7 x weekly - 1 sets - 10 reps  - Supine Shoulder Press with Dowel  - 2 x daily - 7 x weekly - 1 sets - 10 reps  - Supine Shoulder External Rotation with Dowel  - 2 x daily - 7 x weekly - 1 sets - 10 reps       Goals:  Active       PT Problem       PT Goal 1 (Met)       Start:  12/13/23    Expected End:  12/27/23    Resolved:  01/31/24    Patient will have AROM right elbow 0-135 deg without compensation/ease and no pain [id number].  Patient will have WNL forearm pronation and supination without pain/discomfort/with ease         PT Goal 2 (Progressing)       Start:  12/13/23    Expected End:  02/29/24       -Patient will have improved AAROM R shoulder to 120 elevation (scaption/flexion) with ease and no discomfort in sitting in order to easily feed self, wash hair, brush teeth - MET 2/27/24  -Patient will have WNL shoulder extension in sitting (AROM) without discomfort/with ease to nav/doff jacket..  MET  -Patient will have quick DASH score of 25% or less to improve ADL tolerance  PROGRESSING, 30% 2/27/24  -Patient will be able to sleep in bed with only 1 sleep interruption per night x1 week  PROGRESSING 2/27/24 patient reports this is finally getting better             PT Goal 3 (Progressing)       Start:  12/13/23    Expected End:  02/29/24       -Patient will have AROM R shoulder flexion and scaption to 120 deg without pain/discomfort /with ease to perform overhead activities and ADLS  PROGRESSING 2/27/24, has 107 deg  -Patient will be able to nav/doff upper body garments with ease and without discomfort in normal fashion... PROGRESSING 2/27/24  -Patient will have quick DASH score of 15% or less to improve ADL tolerance  PROGRESSING is 30% as of 2/27/24  -patient will have no sleep interruptions in bed x1 week  PROGRESSING 2/27/24         PT Goal 4 (Progressing)       Start:  12/13/23    Expected End:  02/29/24       Patient will be independent with HEP within  restrictions of protocol to improve ROM, strength, mobility of RUE for ADLS...ongoing   PROGRESSING

## 2024-03-18 ENCOUNTER — TREATMENT (OUTPATIENT)
Dept: PHYSICAL THERAPY | Facility: CLINIC | Age: 74
End: 2024-03-18
Payer: MEDICARE

## 2024-03-18 DIAGNOSIS — M25.611 SHOULDER STIFFNESS, RIGHT: ICD-10-CM

## 2024-03-18 PROCEDURE — 97110 THERAPEUTIC EXERCISES: CPT | Mod: GP,CQ

## 2024-03-18 PROCEDURE — 97140 MANUAL THERAPY 1/> REGIONS: CPT | Mod: GP,CQ

## 2024-03-18 ASSESSMENT — PAIN - FUNCTIONAL ASSESSMENT: PAIN_FUNCTIONAL_ASSESSMENT: 0-10

## 2024-03-18 ASSESSMENT — PAIN SCALES - GENERAL: PAINLEVEL_OUTOF10: 0 - NO PAIN

## 2024-03-18 NOTE — PROGRESS NOTES
Physical Therapy Treatment    Patient Name: Dionicio Valdez  MRN: [id number]  Today's Date: 3/18/2024  Time Calculation  Start Time: 1358  Stop Time: 1445  Time Calculation (min): 47 min  PT Therapeutic Procedures Time Entry  Manual Therapy Time Entry: 14  Therapeutic Exercise Time Entry: 31       Assessment:   Patient identified by name and date of birth. Added 1/2 foam roll for scap strengthening and instruction for HEP.  He required decreased cues for winging this date with static activities. He continues to present with palpable tension/muscle restrictions with STW/PROM.     Plan:  OP PT Plan  Treatment/Interventions: Cryotherapy, Education/ Instruction, Hot pack, Manual therapy, Neuromuscular re-education, Self care/ home management, Therapeutic exercises  PT Plan: Skilled PT  PT Frequency: 2 times per week  Onset Date: 11/16/23  Number of Treatments Authorized: need new auth  Rehab Potential: Good  Plan of Care Agreement: Patient    Patient has re-check with PT next visit.     Current Problem  Problem List Items Addressed This Visit             ICD-10-CM    Shoulder stiffness, right M25.611       Subjective Patient reported compliance with % of the time and verbalized understanding.  Patient reported he is able to reach in his back pocket. He reported /10 pain after treatment.   General  Reason for Referral: acute pain right shoulder, s/p reverse total shoulder arthroplasty, right  Referred By: Rylee Northeim PA, oliger is surgeon  Past Medical History Relevant to Rehab: healthy, arthritis  Precautions  Precautions  Post-Surgical Precautions: Right shoulder precautions    Pain  Pain Assessment: 0-10  Pain Score: 0 - No pain  Pain Location: Shoulder  Pain Orientation: Right     Treatments:  Therapeutic Exercise  Therapeutic Exercise Performed: Yes  UBE 3' fwd 3' bwd Level 2  Ball on wall play ball x30 4# flex/ext,lat,CW,CCW  Wall push up with plus 2x10 and I/S   Stabilize and reach 2 x 10 green loop band  "(P)  1/2 foam roll (N)  -pec stretch 2 x 1'   -field goal x10   -alban x10   -hugs x10   Forearm plank 3 x 30\" (no winging)   Quadruped mod pertubatios to trunk with shoulder protraction (no winging) 20\"x3 (X)  Quadruped, L arm flexion /unweighted and mod perturbations to L arm to stabilize on R 30\" x3 (no winging)    Not time this date;   Cat camel x10 (X)  BOSU on elevated plinth rocks flex/ext,lat, CCW,CW x15 (X)  Prone I's, Y's, T's, x15 3\" hold   IR strap stretch 10\" hold x10    Prone   -Rows 15 3#  -abd 15 3#   -scap 15 3#  -ext 15 3#     Rhythmic stabilization: supine shoulder flex 30,60,90,120deg and protraction 20\" ea with mod perturbations at elbow, second set 20\" ea mod perturbations mid forearm   T-band active (progress from walk outs)  -ER, IR, flex, ext green band 3 x 10 ea   Wall slide flex/scap x10 10\" hold (X)  Stabilize and reach   Rows 2 x 10 (X) reviewed for HEP   ER stretch mild tension 3 x 10\"  Door stretch 3 ways 3 x 10\" cues to perform writhen tolerance   S/L ER 2 x 10 2#   S/L scap retraction with abd x10   S/L scap retraction with flex x10   Seated B ER no resistance x10 2\" hold    Pulleys forward elevation 3' x     Manual Therapy  Manual Therapy Performed: Yes   IASTM/STM/manual stretching: right pectoralis lateral border, under axilla anteriorly and posteriorly, deltoids, medial scap border (STM only)  Cupping to bicep/pec static/dynamic movements (X)  Scar mobilization and review (X)  PROM completed.   Scap mobs completed   OP EDUCATION:   Access Code: [email or web address]  URL: [email or web address]  Date: 03/04/2024  Prepared by: Arlene Galeano     Exercises  - Prone Scapular Slide with Shoulder Extension  - 1 x daily - 7 x weekly - 2 sets - 10 reps - 3-5 hold  - Prone Scapular Retraction Arms at Side  - 1 x daily - 7 x weekly - 2 sets - 10 reps - 3-5 hold  - Prone Scapular Retraction Y  - 1 x daily - 7 x weekly - 2 sets - 10 reps - 3-5 holdAccess Code: [email or web address]  URL: " [email or web address]  Date: 02/23/2024  Prepared by: Arlene Galeano     Exercises  - Doorway Pec Stretch at 60 Elevation  - 1 x daily - 7 x weekly - 3 sets - 10 reps  - Doorway Pec Stretch at 90 Degrees Abduction  - 1 x daily - 7 x weekly - 3 sets - 10 reps  Access Code: [email or web address]  URL: [email or web address]  Date: 02/14/2024  Prepared by: Arlene Galeano     Exercises  - Prone Single Arm Shoulder Y  - 1 x daily - 7 x weekly - 2 sets - 10 reps  - Prone Shoulder Horizontal Abduction  - 1 x daily - 7 x weekly - 2 sets - 10 reps  - Prone Shoulder Extension - Single Arm  - 1 x daily - 7 x weekly - 2 sets - 10 reps  - Prone Shoulder Row  - 1 x daily - 7 x weekly - 2 sets - 10 reps  - Standing Bilateral Low Shoulder Row with Anchored Resistance  - 1 x daily - 7 x weekly - 2 sets - 10 reps  - Shoulder External Rotation and Scapular Retraction  - 1 x daily - 7 x weekly - 2 sets - 10 reps  - Standing Shoulder External Rotation Stretch in Doorway  - 1 x daily - 7 x weekly - 1 sets - 3 reps - 10 hold   Outpatient Education  Individual(s) Educated: Patient, Spouse  Education Provided: Home Exercise Program  Diagnosis and Precautions: R shoulder stiffness, pain, s/p R reverse shoulder 11/16/23, see above prec  Patient Response to Education: Patient/Caregiver Verbalized Understanding of Information, Patient/Caregiver Performed Return Demonstration of Exercises/Activities  Education Comment: add heat under arm/axilla f/b shoulder stretching, add shoulder flexion stretch leaning forward on table   Access Code: [email or web address]  URL: [email or web address]  Date: 01/26/2024  Prepared by: Arlene Galeano     Exercises  - Shoulder External Rotation Reactive Isometrics  - 1 x daily - 7 x weekly - 2 sets - 10 reps  - Shoulder Internal Rotation Reactive Isometrics  - 1 x daily - 7 x weekly - 2 sets - 10 reps  - Standing Shoulder Flexion Reactive Isometric  - 1 x daily - 7 x weekly  - 2 sets - 10 reps  - Standing Shoulder Row Reactive Isometric  - 1 x daily - 7 x weekly - 2 sets - 10 reps  - Sidelying Shoulder External Rotation  - 1 x daily - 7 x weekly - 2 sets - 10 reps  - Shoulder Flexion Wall Slide with Towel  - 1 x daily - 7 x weekly - 2 sets - 10 reps - 3-5 hold  - Scaption Wall Slide with Towel  - 1 x daily - 7 x weekly - 2 sets - 10 reps - 3-5 hold  Access Code: [email or web address]  URL: [email or web address]  Date: 01/08/2024  Prepared by: Arlene Galeano     Exercises  - Seated Upper Trapezius Stretch  - 1 x daily - 7 x weekly - 1 sets - 3 reps - 20-30 hold  - Gentle Levator Scapulae Stretch  - 1 x daily - 7 x weekly - 1 sets - 3 reps - 20-30 hold  - Standing Isometric Shoulder Internal Rotation at Doorway  - 1 x daily - 7 x weekly - 2 sets - 10 reps - 3-5 hold  - Standing Isometric Shoulder External Rotation with Doorway  - 1 x daily - 7 x weekly - 2 sets - 10 reps - 3-5 hold  - Standing Isometric Shoulder Flexion with Doorway - Arm Bent  - 1 x daily - 7 x weekly - 2 sets - 10 reps - 3-5 hold  - Standing Bicep Curls Supinated with Dumbbells  - 1 x daily - 7 x weekly - 2 sets - 10 reps  - Seated Wrist Flexion with Dumbbell  - 1 x daily - 7 x weekly - 2 sets - 10 reps  - Seated Wrist Extension with Dumbbell  - 1 x daily - 7 x weekly - 2 sets - 10 reps  Not today: 12-18-23:   Access Code: [email or web address]  URL: [email or web address]  Date: 12/13/2023  Prepared by: Abby Infante   Exercises  - Seated Scapular Retraction  - 4 x daily - 7 x weekly - 1 sets - 10 reps  - Seated Backward Shoulder Rolls  - 4 x daily - 7 x weekly - 1 sets - 10 reps  - Supine Elbow Extension Stretch with Weight  - 2 x daily - 7 x weekly - 2 sets - 2 reps - 2-3 min hold  - Forearm Pronation and Supination with Hammer  - 1 x daily - 7 x weekly - 3 sets - 10 reps - 10 hold  - Supine Shoulder Flexion Extension AAROM with Dowel  - 2 x daily - 7 x weekly - 1 sets - 10 reps  - Supine Shoulder  Abduction AAROM with Dowel  - 2 x daily - 7 x weekly - 1 sets - 10 reps  - Supine Shoulder Press with Dowel  - 2 x daily - 7 x weekly - 1 sets - 10 reps  - Supine Shoulder External Rotation with Dowel  - 2 x daily - 7 x weekly - 1 sets - 10 reps       Goals:  Active       PT Problem       PT Goal 1 (Met)       Start:  12/13/23    Expected End:  12/27/23    Resolved:  01/31/24    Patient will have AROM right elbow 0-135 deg without compensation/ease and no pain [id number].  Patient will have WNL forearm pronation and supination without pain/discomfort/with ease         PT Goal 2 (Progressing)       Start:  12/13/23    Expected End:  02/29/24       -Patient will have improved AAROM R shoulder to 120 elevation (scaption/flexion) with ease and no discomfort in sitting in order to easily feed self, wash hair, brush teeth - MET 2/27/24  -Patient will have WNL shoulder extension in sitting (AROM) without discomfort/with ease to nav/doff jacket..  MET  -Patient will have quick DASH score of 25% or less to improve ADL tolerance  PROGRESSING, 30% 2/27/24  -Patient will be able to sleep in bed with only 1 sleep interruption per night x1 week  PROGRESSING 2/27/24 patient reports this is finally getting better             PT Goal 3 (Progressing)       Start:  12/13/23    Expected End:  02/29/24       -Patient will have AROM R shoulder flexion and scaption to 120 deg without pain/discomfort /with ease to perform overhead activities and ADLS  PROGRESSING 2/27/24, has 107 deg  -Patient will be able to nav/doff upper body garments with ease and without discomfort in normal fashion... PROGRESSING 2/27/24  -Patient will have quick DASH score of 15% or less to improve ADL tolerance  PROGRESSING is 30% as of 2/27/24  -patient will have no sleep interruptions in bed x1 week  PROGRESSING 2/27/24         PT Goal 4 (Progressing)       Start:  12/13/23    Expected End:  02/29/24       Patient will be independent with HEP within  restrictions of protocol to improve ROM, strength, mobility of RUE for ADLS...ongoing   PROGRESSING

## 2024-03-20 ENCOUNTER — TREATMENT (OUTPATIENT)
Dept: PHYSICAL THERAPY | Facility: CLINIC | Age: 74
End: 2024-03-20
Payer: MEDICARE

## 2024-03-20 DIAGNOSIS — M25.611 SHOULDER STIFFNESS, RIGHT: ICD-10-CM

## 2024-03-20 PROCEDURE — 97110 THERAPEUTIC EXERCISES: CPT | Mod: GP | Performed by: PHYSICAL THERAPIST

## 2024-03-20 ASSESSMENT — PAIN - FUNCTIONAL ASSESSMENT: PAIN_FUNCTIONAL_ASSESSMENT: 0-10

## 2024-03-20 ASSESSMENT — PAIN SCALES - GENERAL: PAINLEVEL_OUTOF10: 1

## 2024-03-20 NOTE — PROGRESS NOTES
"Physical Therapy Treatment      Patient Name: Dionicio Valdez  MRN: [id number]  Today's Date: 3/20/2024  Time Calculation  Start Time: 1315  Stop Time: 1350  Time Calculation (min): 35 min     PT Therapeutic Procedures Time Entry  Therapeutic Exercise Time Entry: 31     Current Problem  Problem List Items Addressed This Visit             ICD-10-CM    Shoulder stiffness, right M25.611       Subjective   Patient reports he feels he is doing well and states these additional visits have helped him.  States he feels he is ready for discharge to [facility].  Reports \"1/2\" pain rating.  States he hasn't been having the popping/snapping in shoulder blade area he was having but still is tight/sore and points to front of shoulder.    Reports continued compliance.  States he feels confident to do all his \"normal stuff\" just not \"strenuous stuff\".  States he can now get handkerchief in and out of back pocket which was his goal.      Reason for Referral: acute pain right shoulder, s/p reverse total shoulder arthroplasty, right  Referred By: Rylee Northeim PA, oliger is surgeon  Past Medical History Relevant to Rehab: healthy, arthritis      Precautions  Precautions  Post-Surgical Precautions: Right shoulder precautions      Pain  Pain Assessment: 0-10  Pain Score: 1  Pain Location: Shoulder  Pain Orientation: Right    objective  AROM shoulder  seated:  -flexion 122 deg  -abd 112 deg     Supine rotation at 45 deg abd:  -ER=44  -IR=43    Supine flexion 134 deg    R Periscapular strength prone:  Mid trap 4/5  Rhomboid 4+/5  Lower trap 4-/5  Latissimus 4+/5    R shoulder flexion=4/5    Treatments:  Therapeutic Exercise  Therapeutic Exercise Performed: Yes  UBE 3' fwd 3' bwd Level 2  Pulley 4'  Ball on wall play ball x30 4# flex/ext,lat,CW,CCWx  Wall push up with plus 2x10  Stabilize and reach 2 x 10 green loop bandx  1/2 foam roll (reviewed  -pec stretch 2 x 1'   -field goal x10   -alban x10   -hugs x10   Forearm plank 3 x 30\" (no winging) " "  Quadruped mod pertubatios to trunk with shoulder protraction (no winging) 20\"x3 (X)  Quadruped, L arm flexion /unweighted and mod perturbations to L arm to stabilize on R 30\" x3 (no winging)  Forearm plank with progression of exercises for HEP  Plinth push up plus (fully raisesed and then lowered) x10 ea    Not time this date;   Cat camel x10 (X)  BOSU on elevated plinth rocks flex/ext,lat, CCW,CW x15 (X)  Prone I's, Y's, T's, x15 3\" hold   IR strap stretch 10\" hold x10    Prone   -Rows 15 3#  -abd 15 3#   -scap 15 3#  -ext 15 3#     Rhythmic stabilization: supine shoulder flex 30,60,90,120deg and protraction 20\" ea with mod perturbations at elbow, second set 20\" ea mod perturbations mid forearm   T-band active (progress from walk outs)  -ER, IR, flex, ext green band 3 x 10 ea   Wall slide flex/scap x10 10\" hold (X)  Stabilize and reach   Rows 2 x 10 (X) reviewed for HEP   ER stretch mild tension 3 x 10\"  Door stretch 3 ways 3 x 10\" cues to perform writhen tolerance   S/L ER 2 x 10 2#   S/L scap retraction with abd x10   S/L scap retraction with flex x10   Seated B ER no resistance x10 2\" hold    Pulleys forward elevation 3' x     Manual Therapy  Manual Therapy Performed:    IASTM/STM/manual stretching: right pectoralis lateral border, under axilla anteriorly and posteriorly, deltoids, medial scap border (STM only)  Cupping to bicep/pec static/dynamic movements (X)  Scar mobilization and review (X)  PROM completed.   Scap mobs completed     Assessment:   PT Assessment  Evaluation/Treatment Tolerance: Patient tolerated treatment well    Patient has attended 21 visits of outpatient PT.  He has made excellent progress with intervention.  All his goals are met.  Patient is compliant with HEP and does a very good job with it.  His scapular winging has significantly improved on R shoulder and it is only minimal.  Patient has good AROM which is functional for his ADLS and he is happy with his progerss.  He will be " discharged to [facility] at this time.    OUTCOME MEASURE  DASH=5%    Plan:   OP PT Plan  Treatment/Interventions: Cryotherapy, Education/ Instruction, Hot pack, Manual therapy, Neuromuscular re-education, Self care/ home management, Therapeutic exercises  PT Plan: No Additional PT interventions required at this time (discharge to [facility])  Onset Date: 11/16/23  Rehab Potential: Good  Plan of Care Agreement: Patient      OP EDUCATION:  Outpatient Education  Individual(s) Educated: Patient  Education Provided: Home Exercise Program  Diagnosis and Precautions: R shoulder stiffness, pain, s/p R reverse shoulder 11/16/23, see above prec  Patient/Caregiver Demonstrated Understanding: yes  Plan of Care Discussed and Agreed Upon: yes  Education Comment: reviewed HEP;  added lower trap raise;  educated on exercises to progress forearm plank      Goals:  Resolved       PT Problem       PT Goal 1 (Met)       Start:  12/13/23    Expected End:  12/27/23    Resolved:  01/31/24    Patient will have AROM right elbow 0-135 deg without compensation/ease and no pain [id number].  Patient will have WNL forearm pronation and supination without pain/discomfort/with ease         PT Goal 2 (Met)       Start:  12/13/23    Expected End:  02/29/24    Resolved:  03/20/24    -Patient will have improved AAROM R shoulder to 120 elevation (scaption/flexion) with ease and no discomfort in sitting in order to easily feed self, wash hair, brush teeth - MET 2/27/24  -Patient will have WNL shoulder extension in sitting (AROM) without discomfort/with ease to nav/doff jacket..  MET  -Patient will have quick DASH score of 25% or less to improve ADL tolerance  MET 5% 3/20/24  -Patient will be able to sleep in bed with only 1 sleep interruption per night x1 week MET 3/20/24             PT Goal 3 (Met)       Start:  12/13/23    Expected End:  02/29/24    Resolved:  03/20/24         PT Goal 4 (Met)       Start:  12/13/23    Expected End:  02/29/24    Resolved:   03/20/24    Patient will be independent with HEP within restrictions of protocol to improve ROM, strength, mobility of RUE for ADLS...ongoing  MET

## 2024-05-10 ENCOUNTER — LAB (OUTPATIENT)
Dept: LAB | Facility: LAB | Age: 74
End: 2024-05-10
Payer: MEDICARE

## 2024-05-10 DIAGNOSIS — E78.2 MIXED HYPERLIPIDEMIA: ICD-10-CM

## 2024-05-10 DIAGNOSIS — Z12.5 PROSTATE CANCER SCREENING: ICD-10-CM

## 2024-05-10 DIAGNOSIS — I51.9 ASYMPTOMATIC LV DYSFUNCTION: ICD-10-CM

## 2024-05-10 DIAGNOSIS — R00.2 PALPITATIONS: ICD-10-CM

## 2024-05-10 LAB
ALBUMIN SERPL BCP-MCNC: 4.2 G/DL (ref 3.4–5)
ALP SERPL-CCNC: 42 U/L (ref 33–136)
ALT SERPL W P-5'-P-CCNC: 26 U/L (ref 10–52)
ANION GAP SERPL CALC-SCNC: 8 MMOL/L (ref 10–20)
AST SERPL W P-5'-P-CCNC: 20 U/L (ref 9–39)
BASOPHILS # BLD AUTO: 0.04 X10*3/UL (ref 0–0.1)
BASOPHILS NFR BLD AUTO: 0.6 %
BILIRUB SERPL-MCNC: 0.8 MG/DL (ref 0–1.2)
BUN SERPL-MCNC: 14 MG/DL (ref 6–23)
CALCIUM SERPL-MCNC: 8.7 MG/DL (ref 8.6–10.3)
CHLORIDE SERPL-SCNC: 106 MMOL/L (ref 98–107)
CHOLEST SERPL-MCNC: 152 MG/DL (ref 0–199)
CHOLESTEROL/HDL RATIO: 3
CO2 SERPL-SCNC: 29 MMOL/L (ref 21–32)
CREAT SERPL-MCNC: 0.87 MG/DL (ref 0.5–1.3)
EGFRCR SERPLBLD CKD-EPI 2021: >90 ML/MIN/1.73M*2
EOSINOPHIL # BLD AUTO: 0.17 X10*3/UL (ref 0–0.4)
EOSINOPHIL NFR BLD AUTO: 2.5 %
ERYTHROCYTE [DISTWIDTH] IN BLOOD BY AUTOMATED COUNT: 13.4 % (ref 11.5–14.5)
GLUCOSE SERPL-MCNC: 88 MG/DL (ref 74–99)
HCT VFR BLD AUTO: 45.5 % (ref 41–52)
HDLC SERPL-MCNC: 51 MG/DL
HGB BLD-MCNC: 14.8 G/DL (ref 13.5–17.5)
IMM GRANULOCYTES # BLD AUTO: 0.01 X10*3/UL (ref 0–0.5)
IMM GRANULOCYTES NFR BLD AUTO: 0.1 % (ref 0–0.9)
LDLC SERPL CALC-MCNC: 86 MG/DL
LYMPHOCYTES # BLD AUTO: 3.39 X10*3/UL (ref 0.8–3)
LYMPHOCYTES NFR BLD AUTO: 50.1 %
MCH RBC QN AUTO: 31.6 PG (ref 26–34)
MCHC RBC AUTO-ENTMCNC: 32.5 G/DL (ref 32–36)
MCV RBC AUTO: 97 FL (ref 80–100)
MONOCYTES # BLD AUTO: 0.64 X10*3/UL (ref 0.05–0.8)
MONOCYTES NFR BLD AUTO: 9.5 %
NEUTROPHILS # BLD AUTO: 2.51 X10*3/UL (ref 1.6–5.5)
NEUTROPHILS NFR BLD AUTO: 37.2 %
NON HDL CHOLESTEROL: 101 MG/DL (ref 0–149)
NRBC BLD-RTO: 0 /100 WBCS (ref 0–0)
PLATELET # BLD AUTO: 237 X10*3/UL (ref 150–450)
POTASSIUM SERPL-SCNC: 3.9 MMOL/L (ref 3.5–5.3)
PROT SERPL-MCNC: 6.5 G/DL (ref 6.4–8.2)
PSA SERPL-MCNC: 1.11 NG/ML
RBC # BLD AUTO: 4.68 X10*6/UL (ref 4.5–5.9)
SODIUM SERPL-SCNC: 139 MMOL/L (ref 136–145)
TRIGL SERPL-MCNC: 76 MG/DL (ref 0–149)
VLDL: 15 MG/DL (ref 0–40)
WBC # BLD AUTO: 6.8 X10*3/UL (ref 4.4–11.3)

## 2024-05-10 PROCEDURE — 36415 COLL VENOUS BLD VENIPUNCTURE: CPT

## 2024-05-10 PROCEDURE — 80061 LIPID PANEL: CPT

## 2024-05-10 PROCEDURE — 85025 COMPLETE CBC W/AUTO DIFF WBC: CPT

## 2024-05-10 PROCEDURE — 80053 COMPREHEN METABOLIC PANEL: CPT

## 2024-05-10 PROCEDURE — G0103 PSA SCREENING: HCPCS

## 2024-05-15 ENCOUNTER — OFFICE VISIT (OUTPATIENT)
Dept: PRIMARY CARE | Facility: CLINIC | Age: 74
End: 2024-05-15
Payer: MEDICARE

## 2024-05-15 VITALS
WEIGHT: 190.5 LBS | HEIGHT: 70 IN | OXYGEN SATURATION: 94 % | BODY MASS INDEX: 27.27 KG/M2 | DIASTOLIC BLOOD PRESSURE: 80 MMHG | SYSTOLIC BLOOD PRESSURE: 116 MMHG | HEART RATE: 58 BPM

## 2024-05-15 DIAGNOSIS — F17.211 NICOTINE DEPENDENCE, CIGARETTES, IN REMISSION: ICD-10-CM

## 2024-05-15 DIAGNOSIS — Z13.31 DEPRESSION SCREENING: ICD-10-CM

## 2024-05-15 DIAGNOSIS — Z00.00 ROUTINE GENERAL MEDICAL EXAMINATION AT HEALTH CARE FACILITY: Primary | ICD-10-CM

## 2024-05-15 DIAGNOSIS — Z12.2 ENCOUNTER FOR SCREENING FOR LUNG CANCER: ICD-10-CM

## 2024-05-15 DIAGNOSIS — Z86.2 HX OF SARCOIDOSIS: ICD-10-CM

## 2024-05-15 DIAGNOSIS — Z12.11 ENCOUNTER FOR SCREENING FOR MALIGNANT NEOPLASM OF COLON: ICD-10-CM

## 2024-05-15 PROCEDURE — 1160F RVW MEDS BY RX/DR IN RCRD: CPT | Performed by: STUDENT IN AN ORGANIZED HEALTH CARE EDUCATION/TRAINING PROGRAM

## 2024-05-15 PROCEDURE — 1159F MED LIST DOCD IN RCRD: CPT | Performed by: STUDENT IN AN ORGANIZED HEALTH CARE EDUCATION/TRAINING PROGRAM

## 2024-05-15 PROCEDURE — 1170F FXNL STATUS ASSESSED: CPT | Performed by: STUDENT IN AN ORGANIZED HEALTH CARE EDUCATION/TRAINING PROGRAM

## 2024-05-15 PROCEDURE — 1036F TOBACCO NON-USER: CPT | Performed by: STUDENT IN AN ORGANIZED HEALTH CARE EDUCATION/TRAINING PROGRAM

## 2024-05-15 PROCEDURE — G0439 PPPS, SUBSEQ VISIT: HCPCS | Performed by: STUDENT IN AN ORGANIZED HEALTH CARE EDUCATION/TRAINING PROGRAM

## 2024-05-15 ASSESSMENT — ACTIVITIES OF DAILY LIVING (ADL)
GROCERY_SHOPPING: INDEPENDENT
MANAGING_FINANCES: INDEPENDENT
DOING_HOUSEWORK: INDEPENDENT
BATHING: INDEPENDENT
DRESSING: INDEPENDENT
TAKING_MEDICATION: INDEPENDENT

## 2024-05-15 ASSESSMENT — PATIENT HEALTH QUESTIONNAIRE - PHQ9
1. LITTLE INTEREST OR PLEASURE IN DOING THINGS: NOT AT ALL
2. FEELING DOWN, DEPRESSED OR HOPELESS: NOT AT ALL
SUM OF ALL RESPONSES TO PHQ9 QUESTIONS 1 AND 2: 0

## 2024-05-15 ASSESSMENT — ENCOUNTER SYMPTOMS: OCCASIONAL FEELINGS OF UNSTEADINESS: 0

## 2024-05-15 NOTE — PROGRESS NOTES
"Subjective   Reason for Visit: Dionicio Valdez is an 74 y.o. male here for a Medicare Wellness visit.     Past Medical, Surgical, and Family History reviewed and updated in chart.    Reviewed all medications by prescribing practitioner or clinical pharmacist (such as prescriptions, OTCs, herbal therapies and supplements) and documented in the medical record.    Chief Complaint   Patient presents with    Medicare Annual Wellness Visit Subsequent     PT is here today for 1 year and AWV. Needs Colonoscopy ordered and AAA.          HPI   Here for wellness.     Reports that he has pain in the right anterior iliac crest area.   Has had hip joint injection in fall 2023. Worked well him.   He will try cold/warm compresses.     Reviewed lab test results with the patient.     Colonoscopy ordered. Sister has hx of colon cancer screening.   Hx of smoking more than 25-30 pack years. Hx of asbestos exposure.  Low dose CT scan is ordered.   AAA screening and cardiac calcium scoring - he will discuss with cards.     Depression Screening done    Patient Care Team:  James Moreno MD MPH as PCP - General  James Moreno MD MPH as PCP - [employer] PCP     Review of Systems  ROS negative except discussed above in HPI.    Objective   Vitals:  /80   Pulse 58   Ht 1.778 m (5' 10\")   Wt 86.4 kg (190 lb 8 oz)   SpO2 94%   BMI 27.33 kg/m²       Physical Exam  Constitutional:       Appearance: Normal appearance.   HENT:      Right Ear: Tympanic membrane normal.      Left Ear: Tympanic membrane normal.   Cardiovascular:      Rate and Rhythm: Normal rate and regular rhythm.      Pulses: Normal pulses.      Heart sounds: Normal heart sounds.   Pulmonary:      Effort: Pulmonary effort is normal.      Breath sounds: Normal breath sounds.   Musculoskeletal:      Cervical back: Normal range of motion and neck supple.   Lymphadenopathy:      Cervical: No cervical adenopathy.   Neurological:      Mental " Status: He is alert.         Assessment/Plan   Problem List Items Addressed This Visit    None  Visit Diagnoses       Routine general medical examination at health care facility    -  Primary    Hx of sarcoidosis        Relevant Orders    XR chest 2 views    Encounter for screening for malignant neoplasm of colon        Relevant Orders    Colonoscopy Screening; Average Risk Patient    Encounter for screening for lung cancer        Relevant Orders    CT lung screening low dose    Nicotine dependence, cigarettes, in remission        Relevant Orders    CT lung screening low dose          Follow up in a  year.

## 2024-05-22 DIAGNOSIS — Z96.611 S/P REVERSE TOTAL SHOULDER ARTHROPLASTY, RIGHT: ICD-10-CM

## 2024-05-22 RX ORDER — AMOXICILLIN 500 MG/1
CAPSULE ORAL
Qty: 4 CAPSULE | Refills: 5 | Status: SHIPPED | OUTPATIENT
Start: 2024-05-22

## 2024-05-24 DIAGNOSIS — Z12.11 COLON CANCER SCREENING: ICD-10-CM

## 2024-06-04 ENCOUNTER — HOSPITAL ENCOUNTER (OUTPATIENT)
Dept: RADIOLOGY | Facility: HOSPITAL | Age: 74
Discharge: HOME | End: 2024-06-04
Payer: MEDICARE

## 2024-06-04 DIAGNOSIS — F17.211 NICOTINE DEPENDENCE, CIGARETTES, IN REMISSION: ICD-10-CM

## 2024-06-04 DIAGNOSIS — Z12.2 ENCOUNTER FOR SCREENING FOR LUNG CANCER: ICD-10-CM

## 2024-06-04 PROCEDURE — 71271 CT THORAX LUNG CANCER SCR C-: CPT

## 2024-06-12 DIAGNOSIS — E78.00 HYPERCHOLESTEROLEMIA: ICD-10-CM

## 2024-06-12 RX ORDER — ATORVASTATIN CALCIUM 20 MG/1
20 TABLET, FILM COATED ORAL DAILY
Qty: 90 TABLET | Refills: 3 | Status: SHIPPED | OUTPATIENT
Start: 2024-06-12 | End: 2025-06-12

## 2024-08-07 ENCOUNTER — APPOINTMENT (OUTPATIENT)
Dept: GASTROENTEROLOGY | Facility: CLINIC | Age: 74
End: 2024-08-07
Payer: MEDICARE

## 2024-08-20 RX ORDER — ASPIRIN 81 MG/1
81 TABLET ORAL DAILY
COMMUNITY

## 2024-08-23 ENCOUNTER — APPOINTMENT (OUTPATIENT)
Dept: GASTROENTEROLOGY | Facility: CLINIC | Age: 74
End: 2024-08-23
Payer: MEDICARE

## 2024-08-23 ENCOUNTER — HOSPITAL ENCOUNTER (OUTPATIENT)
Dept: OPERATING ROOM | Facility: HOSPITAL | Age: 74
Setting detail: OUTPATIENT SURGERY
Discharge: HOME | End: 2024-08-23
Payer: MEDICARE

## 2024-08-23 VITALS
RESPIRATION RATE: 16 BRPM | WEIGHT: 183.2 LBS | OXYGEN SATURATION: 96 % | TEMPERATURE: 97.6 F | BODY MASS INDEX: 27.13 KG/M2 | HEART RATE: 54 BPM | HEIGHT: 69 IN | SYSTOLIC BLOOD PRESSURE: 116 MMHG | DIASTOLIC BLOOD PRESSURE: 78 MMHG

## 2024-08-23 DIAGNOSIS — Z12.11 COLON CANCER SCREENING: ICD-10-CM

## 2024-08-23 PROCEDURE — 2500000004 HC RX 250 GENERAL PHARMACY W/ HCPCS (ALT 636 FOR OP/ED): Performed by: INTERNAL MEDICINE

## 2024-08-23 PROCEDURE — 3600000007 HC OR TIME - EACH INCREMENTAL 1 MINUTE - PROCEDURE LEVEL TWO: Performed by: INTERNAL MEDICINE

## 2024-08-23 PROCEDURE — 7100000010 HC PHASE TWO TIME - EACH INCREMENTAL 1 MINUTE: Performed by: INTERNAL MEDICINE

## 2024-08-23 PROCEDURE — 7100000009 HC PHASE TWO TIME - INITIAL BASE CHARGE: Performed by: INTERNAL MEDICINE

## 2024-08-23 PROCEDURE — 45385 COLONOSCOPY W/LESION REMOVAL: CPT | Performed by: INTERNAL MEDICINE

## 2024-08-23 PROCEDURE — 3600000002 HC OR TIME - INITIAL BASE CHARGE - PROCEDURE LEVEL TWO: Performed by: INTERNAL MEDICINE

## 2024-08-23 RX ORDER — MEPERIDINE HYDROCHLORIDE 50 MG/ML
INJECTION INTRAMUSCULAR; INTRAVENOUS; SUBCUTANEOUS AS NEEDED
Status: COMPLETED | OUTPATIENT
Start: 2024-08-23 | End: 2024-08-23

## 2024-08-23 RX ORDER — MIDAZOLAM HYDROCHLORIDE 5 MG/ML
INJECTION, SOLUTION INTRAMUSCULAR; INTRAVENOUS AS NEEDED
Status: COMPLETED | OUTPATIENT
Start: 2024-08-23 | End: 2024-08-23

## 2024-08-23 RX ORDER — SODIUM CHLORIDE, SODIUM LACTATE, POTASSIUM CHLORIDE, CALCIUM CHLORIDE 600; 310; 30; 20 MG/100ML; MG/100ML; MG/100ML; MG/100ML
20 INJECTION, SOLUTION INTRAVENOUS CONTINUOUS
Status: DISCONTINUED | OUTPATIENT
Start: 2024-08-23 | End: 2024-08-24 | Stop reason: HOSPADM

## 2024-08-23 ASSESSMENT — PAIN SCALES - GENERAL

## 2024-08-23 ASSESSMENT — PAIN - FUNCTIONAL ASSESSMENT
PAIN_FUNCTIONAL_ASSESSMENT: 0-10

## 2024-08-23 ASSESSMENT — COLUMBIA-SUICIDE SEVERITY RATING SCALE - C-SSRS
2. HAVE YOU ACTUALLY HAD ANY THOUGHTS OF KILLING YOURSELF?: NO
6. HAVE YOU EVER DONE ANYTHING, STARTED TO DO ANYTHING, OR PREPARED TO DO ANYTHING TO END YOUR LIFE?: NO
1. IN THE PAST MONTH, HAVE YOU WISHED YOU WERE DEAD OR WISHED YOU COULD GO TO SLEEP AND NOT WAKE UP?: NO

## 2024-08-23 NOTE — H&P
History Of Present Illness  Dionicio Valdez is a 74 y.o. male presenting with presents for screening colonoscopy.     Past Medical History  Past Medical History:   Diagnosis Date    Arrhythmia     Hyperlipidemia      Surgical History  Past Surgical History:   Procedure Laterality Date    CATARACT EXTRACTION      OTHER SURGICAL HISTORY  07/11/2022    Hernia repair    OTHER SURGICAL HISTORY  07/11/2022    Appendectomy    OTHER SURGICAL HISTORY  07/11/2022    [place] tooth extraction    OTHER SURGICAL HISTORY  07/11/2022    Superior labrum anterior to posterior lesion repair    TOTAL SHOULDER ARTHROPLASTY Right      Social History  He reports that he has quit smoking. His smoking use included cigarettes. He has never used smokeless tobacco. He reports current alcohol use of about 2.0 standard drinks of alcohol per week. He reports that he does not use drugs.    Family History  Family History   Problem Relation Name Age of Onset    Alzheimer's disease Mother      Other (malignant neoplasm of colon) Sister      Aortic aneurysm Brother      Lung cancer Brother      Other (myocardial infarction) Brother      Aortic aneurysm Maternal Grandfather          Allergies  No Known Allergies  Review of Systems  Pre-sedation Evaluation:  ASA Classification - ASA 2 - Patient with mild systemic disease with no functional limitations  Mallampati Score - II (hard and soft palate, upper portion of tonsils and uvula visible)    Physical Exam  Vitals and nursing note reviewed.   Constitutional:       Appearance: Normal appearance.   HENT:      Head: Normocephalic.      Mouth/Throat:      Mouth: Mucous membranes are moist.      Pharynx: Oropharynx is clear.   Eyes:      Pupils: Pupils are equal, round, and reactive to light.   Cardiovascular:      Rate and Rhythm: Normal rate and regular rhythm.      Heart sounds: Normal heart sounds.   Pulmonary:      Effort: Pulmonary effort is normal.      Breath sounds: Normal breath sounds.   Abdominal:       General: Abdomen is flat. Bowel sounds are normal.      Palpations: Abdomen is soft.   Musculoskeletal:         General: Normal range of motion.      Cervical back: Normal range of motion and neck supple.   Skin:     General: Skin is warm and dry.   Neurological:      General: No focal deficit present.      Mental Status: He is alert and oriented to person, place, and time.   Psychiatric:         Mood and Affect: Mood normal.         Behavior: Behavior normal.          Last Recorded Vitals  There were no vitals taken for this visit.     Assessment/Plan   Problem List Items Addressed This Visit    None  Visit Diagnoses       Colon cancer screening        Relevant Orders    Colonoscopy Screening; Average Risk Patient               PTA/Current Medications:  (Not in a hospital admission)    Current Outpatient Medications   Medication Sig Dispense Refill    amoxicillin (Amoxil) 500 mg capsule Take 4 capsules 1 hour before dental appointment (Patient not taking: Reported on 8/20/2024) 4 capsule 5    aspirin 81 mg EC tablet Take 1 tablet (81 mg) by mouth once daily.      atorvastatin (Lipitor) 20 mg tablet Take 1 tablet (20 mg) by mouth once daily. 90 tablet 3    diclofenac sodium (Voltaren) 1 % gel gel Apply 2.25 inches (2 g) topically 4 times a day. For pain      metoprolol succinate XL (Toprol-XL) 25 mg 24 hr tablet Take 1 tablet (25 mg) by mouth once daily. Do not crush or chew.      multivitamin tablet Take 1 tablet by mouth once daily.      tadalafil (Cialis) 5 mg tablet Take 1 tablet (5 mg) by mouth once daily as needed for erectile dysfunction.       No current facility-administered medications for this encounter.     Boone Rachel, DO

## 2024-09-05 LAB
LABORATORY COMMENT REPORT: NORMAL
PATH REPORT.FINAL DX SPEC: NORMAL
PATH REPORT.GROSS SPEC: NORMAL
PATH REPORT.TOTAL CANCER: NORMAL

## 2024-09-09 ENCOUNTER — TELEPHONE (OUTPATIENT)
Dept: GASTROENTEROLOGY | Facility: CLINIC | Age: 74
End: 2024-09-09
Payer: MEDICARE

## 2024-09-09 NOTE — TELEPHONE ENCOUNTER
Called patient with results and he verbalized understanding.   ----- Message from Boone Rachel sent at 9/6/2024  3:21 PM EDT -----  Benign, hyperplastic polyp.  No follow-up necessary

## 2024-09-17 ENCOUNTER — TELEPHONE (OUTPATIENT)
Dept: PRIMARY CARE | Facility: CLINIC | Age: 74
End: 2024-09-17
Payer: MEDICARE

## 2024-09-17 NOTE — TELEPHONE ENCOUNTER
09/17/24 PT seen at [facility] physicians. Encounter note is in pt chart.

## 2024-09-26 ENCOUNTER — TELEPHONE (OUTPATIENT)
Dept: PRIMARY CARE | Facility: CLINIC | Age: 74
End: 2024-09-26
Payer: MEDICARE

## 2024-09-26 NOTE — TELEPHONE ENCOUNTER
09/26/24 PT had an echocardiogram done at [facility] in [place]. Encounter is in chart.

## 2024-12-03 DIAGNOSIS — G89.29 CHRONIC LEFT SHOULDER PAIN: ICD-10-CM

## 2024-12-03 DIAGNOSIS — M25.512 CHRONIC LEFT SHOULDER PAIN: ICD-10-CM

## 2024-12-05 ENCOUNTER — OFFICE VISIT (OUTPATIENT)
Dept: ORTHOPEDIC SURGERY | Facility: CLINIC | Age: 74
End: 2024-12-05
Payer: MEDICARE

## 2024-12-05 ENCOUNTER — HOSPITAL ENCOUNTER (OUTPATIENT)
Dept: RADIOLOGY | Facility: CLINIC | Age: 74
Discharge: HOME | End: 2024-12-05
Payer: MEDICARE

## 2024-12-05 DIAGNOSIS — M25.512 CHRONIC LEFT SHOULDER PAIN: ICD-10-CM

## 2024-12-05 DIAGNOSIS — G89.29 CHRONIC LEFT SHOULDER PAIN: ICD-10-CM

## 2024-12-05 DIAGNOSIS — M75.82 ROTATOR CUFF TENDONITIS, LEFT: Primary | ICD-10-CM

## 2024-12-05 DIAGNOSIS — M19.019 GLENOHUMERAL ARTHRITIS: ICD-10-CM

## 2024-12-05 PROCEDURE — 73030 X-RAY EXAM OF SHOULDER: CPT | Mod: LT

## 2024-12-05 RX ORDER — TRIAMCINOLONE ACETONIDE 40 MG/ML
2.5 INJECTION, SUSPENSION INTRA-ARTICULAR; INTRAMUSCULAR
Status: COMPLETED | OUTPATIENT
Start: 2024-12-05 | End: 2024-12-05

## 2024-12-05 ASSESSMENT — ENCOUNTER SYMPTOMS
CARDIOVASCULAR NEGATIVE: 1
ARTHRALGIAS: 1
CONSTITUTIONAL NEGATIVE: 1
NEUROLOGICAL NEGATIVE: 1
PSYCHIATRIC NEGATIVE: 1
ENDOCRINE NEGATIVE: 1
HEMATOLOGIC/LYMPHATIC NEGATIVE: 1
RESPIRATORY NEGATIVE: 1

## 2024-12-05 ASSESSMENT — PAIN DESCRIPTION - DESCRIPTORS: DESCRIPTORS: SHARP;SHOOTING;STABBING

## 2024-12-05 ASSESSMENT — PAIN - FUNCTIONAL ASSESSMENT: PAIN_FUNCTIONAL_ASSESSMENT: 0-10

## 2024-12-05 ASSESSMENT — PAIN SCALES - GENERAL: PAINLEVEL_OUTOF10: 10 - WORST POSSIBLE PAIN

## 2024-12-05 NOTE — PROGRESS NOTES
"Subjective    Patient ID: Dionicio Valdez is a 74 y.o. male.    Chief Complaint: Pain of the Left Shoulder     HPI  Dionicio is a pleasant 74-year-old gentleman presenting today for new problem evaluation of left shoulder pain.  Patient has had symptoms for approximately 1 month after cutting down trees into firewood using chainsaw.  Patient experienced a cracking noise and reports when pain is intense it, \"feels like a knife is stuck in it.\"  Patient has limited range of motion, aggravated with lifting reaching around back, reaching forward.  Taking ibuprofen which helps ease symptoms a little bit and icing which is not helping, actually aggravate symptoms somewhat.  Symptoms are also worse upon awakening with stiffness and eventually improves over the course of the day.  Also experiencing nighttime awakening.  Patient is right-hand dominant.  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    HENT: Negative.     Respiratory: Negative.     Cardiovascular: Negative.    Endocrine: Negative.    Musculoskeletal:  Positive for arthralgias.   Skin: Negative.    Neurological: Negative.    Hematological: Negative.    Psychiatric/Behavioral: Negative.          Objective   Left Shoulder Exam     Tenderness   The patient is experiencing tenderness in the biceps tendon (Anterior posterior, proximal deltoid region).    Range of Motion   Active abduction:  80   External rotation:  20   Forward flexion:  90     Muscle Strength   External rotation: 4/5   Supraspinatus: 4/5   Biceps: 4/5     Tests   Cross arm: positive  Impingement: positive  Drop arm: negative  Sulcus: absent    Other   Erythema: absent  Sensation: normal  Pulse: present     Comments:  Positive lift off, positive Yergason's  Near full range of motion of neck in all directions with no symptom aggravation  Full range of motion of distal joints with slight pulling sensation with full motion at " the elbow.  Distal motor and sensory intact, cap refill at 2 seconds.  Mild swelling palpated at the anterior rotator cuff  Skin is pink, warm, dry and intact               Image Results:  Independent review of shoulder x-rays completed during today's visit.  There is mild to moderate AC joint arthritis and moderate glenohumeral degenerative changes noted.  There is no acute fracture or misalignment noted.  Await radiology report.    Patient ID: Dionicio Valdez is a 74 y.o. male.    L Inj/Asp: L subacromial bursa on 12/5/2024 4:21 PM  Indications: pain and joint swelling  Details: 22 G needle, posterior approach  Medications: 2.5 mg triamcinolone acetonide 40 mg/mL  Outcome: tolerated well, no immediate complications    We discussed risk and benefits of cortisone injection, patient wishes to proceed via verbal consent.  Skin was prepped with Betadine, vapo coolant spray and alcohol.  Administered injection of 40 mg Kenalog, 3 cc 2% lidocaine and 3 cc of 0.25% bupivacaine.  Patient tolerated injection well with lidocaine suppression.  No active bleeding, bandage applied to site.    Procedure, treatment alternatives, risks and benefits explained, specific risks discussed. Consent was given by the patient. Patient was prepped and draped in the usual sterile fashion.           Assessment/Plan   Encounter Diagnoses:  Problem List Items Addressed This Visit             ICD-10-CM    Rotator cuff tendonitis, left - Primary M75.82     We discussed symptom control with OTC NSAIDs, Tylenol and topical pain relievers.  Instructed on 2 grams diclofenac gel 4 times daily.    Patient may also use heat or ice, whichever offers better relief.    Patient tolerated cortisone injection well with positive lidocaine suppression at today's visit.  I encouraged light activity today and gradually resume normal activities over the next several days.  Beginning in 1 week, I am providing AAOS rotator cuff stretching exercises to begin slowly  and advance as tolerated.    Plan will be to follow-up here in approximately 3 to 4 weeks, sooner for changes or concerns.  Patient in agreement with plan of care.  This note was generated using Dragon software.  It may contain errors in wording, punctuation or spelling.          Glenohumeral arthritis M19.019

## 2024-12-05 NOTE — ASSESSMENT & PLAN NOTE
We discussed symptom control with OTC NSAIDs, Tylenol and topical pain relievers.  Instructed on 2 grams diclofenac gel 4 times daily.    Patient may also use heat or ice, whichever offers better relief.    Patient tolerated cortisone injection well with positive lidocaine suppression at today's visit.  I encouraged light activity today and gradually resume normal activities over the next several days.  Beginning in 1 week, I am providing AAOS rotator cuff stretching exercises to begin slowly and advance as tolerated.    Plan will be to follow-up here in approximately 3 to 4 weeks, sooner for changes or concerns.  Patient in agreement with plan of care.  This note was generated using Dragon software.  It may contain errors in wording, punctuation or spelling.

## 2025-01-07 ENCOUNTER — APPOINTMENT (OUTPATIENT)
Dept: ORTHOPEDIC SURGERY | Facility: CLINIC | Age: 75
End: 2025-01-07
Payer: MEDICARE

## 2025-03-04 ENCOUNTER — TELEPHONE (OUTPATIENT)
Dept: PRIMARY CARE | Facility: CLINIC | Age: 75
End: 2025-03-04
Payer: MEDICARE

## 2025-03-04 NOTE — TELEPHONE ENCOUNTER
Has an appointment with Dr. Castillo 5/21. Asking if he needs to get blood work done. Asking for a call back [phone]

## 2025-03-10 DIAGNOSIS — Z00.00 HEALTHCARE MAINTENANCE: ICD-10-CM

## 2025-03-10 DIAGNOSIS — E78.00 HYPERCHOLESTEROLEMIA: ICD-10-CM

## 2025-03-10 DIAGNOSIS — Z12.5 ENCOUNTER FOR SCREENING FOR MALIGNANT NEOPLASM OF PROSTATE: Primary | ICD-10-CM

## 2025-03-11 ENCOUNTER — APPOINTMENT (OUTPATIENT)
Dept: PRIMARY CARE | Facility: CLINIC | Age: 75
End: 2025-03-11
Payer: MEDICARE

## 2025-03-11 VITALS
WEIGHT: 191.4 LBS | HEART RATE: 69 BPM | OXYGEN SATURATION: 92 % | SYSTOLIC BLOOD PRESSURE: 122 MMHG | BODY MASS INDEX: 28.35 KG/M2 | DIASTOLIC BLOOD PRESSURE: 71 MMHG

## 2025-03-11 DIAGNOSIS — N49.2 NODULE OF SCROTUM: Primary | ICD-10-CM

## 2025-03-11 DIAGNOSIS — N50.82 SCROTAL PAIN: ICD-10-CM

## 2025-03-11 PROCEDURE — 99213 OFFICE O/P EST LOW 20 MIN: CPT | Performed by: NURSE PRACTITIONER

## 2025-03-11 PROCEDURE — 1036F TOBACCO NON-USER: CPT | Performed by: NURSE PRACTITIONER

## 2025-03-11 PROCEDURE — 1159F MED LIST DOCD IN RCRD: CPT | Performed by: NURSE PRACTITIONER

## 2025-03-11 ASSESSMENT — ENCOUNTER SYMPTOMS
CHILLS: 0
FEVER: 0
DIFFICULTY URINATING: 0
FREQUENCY: 0
CARDIOVASCULAR NEGATIVE: 1
RESPIRATORY NEGATIVE: 1
FLANK PAIN: 0

## 2025-03-11 NOTE — PROGRESS NOTES
Subjective   Patient ID: Dionicio Valdez is a 74 y.o. male who presents for Referral (Would like a referral to see a urologist.).  73 yo male patient presents for requesting referral to urology.  Reports he has a lump on left testicle x 6 wks, tender to touch.  Denies pain during urination or ejaculation.  Reports he just finished Amoxicillin for dental caries.          Review of Systems   Constitutional:  Negative for chills and fever.   Respiratory: Negative.     Cardiovascular: Negative.    Genitourinary:  Positive for testicular pain. Negative for difficulty urinating, flank pain, frequency, penile swelling and urgency.       Objective   Physical Exam  Vitals and nursing note reviewed.   Constitutional:       General: He is not in acute distress.  Cardiovascular:      Rate and Rhythm: Normal rate and regular rhythm.      Pulses: Normal pulses.      Heart sounds: Normal heart sounds. No murmur heard.     No friction rub. No gallop.   Pulmonary:      Effort: Pulmonary effort is normal.      Breath sounds: Normal breath sounds. No wheezing, rhonchi or rales.   Abdominal:      General: Bowel sounds are normal.   Genitourinary:     Penis: Normal.       Comments: 1-1.5 cm round firm hard nodule in upper left scrotum.  No erythema, wound, or drainage noted.   Musculoskeletal:      Right lower leg: No edema.      Left lower leg: No edema.   Skin:     General: Skin is warm and dry.   Neurological:      Mental Status: He is alert.   Psychiatric:         Mood and Affect: Mood normal.       /71 (BP Location: Left arm, Patient Position: Sitting)   Pulse 69   Wt 86.8 kg (191 lb 6.4 oz)   SpO2 92%   BMI 28.35 kg/m²       Current Outpatient Medications:     aspirin 81 mg EC tablet, Take 1 tablet (81 mg) by mouth once daily., Disp: , Rfl:     atorvastatin (Lipitor) 20 mg tablet, Take 1 tablet (20 mg) by mouth once daily. (Patient taking differently: Take 2 tablets (40 mg) by mouth once daily.), Disp: 90 tablet, Rfl:  3    metoprolol succinate XL (Toprol-XL) 25 mg 24 hr tablet, Take 1 tablet (25 mg) by mouth once daily. Do not crush or chew., Disp: , Rfl:     multivitamin tablet, Take 1 tablet by mouth once daily., Disp: , Rfl:     tadalafil (Cialis) 5 mg tablet, Take 1 tablet (5 mg) by mouth once daily as needed for erectile dysfunction., Disp: , Rfl:     amoxicillin (Amoxil) 500 mg capsule, Take 4 capsules 1 hour before dental appointment, Disp: 4 capsule, Rfl: 5   Past Medical History:   Diagnosis Date    Arrhythmia     Hyperlipidemia       Past Surgical History:   Procedure Laterality Date    CATARACT EXTRACTION      OTHER SURGICAL HISTORY  07/11/2022    Hernia repair    OTHER SURGICAL HISTORY  07/11/2022    Appendectomy    OTHER SURGICAL HISTORY  07/11/2022    [place] tooth extraction    OTHER SURGICAL HISTORY  07/11/2022    Superior labrum anterior to posterior lesion repair    TOTAL SHOULDER ARTHROPLASTY Right         Family History   Problem Relation Name Age of Onset    Alzheimer's disease Mother      Other (malignant neoplasm of colon) Sister      Aortic aneurysm Brother      Lung cancer Brother      Other (myocardial infarction) Brother      Stroke Brother      Aortic aneurysm Maternal Grandfather          Assessment/Plan   Problem List Items Addressed This Visit       Nodule of scrotum - Primary    Relevant Orders    US scrotum    Referral to Urology   Get US of scrotum, left and refer to urology.

## 2025-03-12 ENCOUNTER — PATIENT MESSAGE (OUTPATIENT)
Dept: PRIMARY CARE | Facility: CLINIC | Age: 75
End: 2025-03-12
Payer: MEDICARE

## 2025-03-12 ENCOUNTER — HOSPITAL ENCOUNTER (OUTPATIENT)
Dept: RADIOLOGY | Facility: HOSPITAL | Age: 75
Discharge: HOME | End: 2025-03-12
Payer: MEDICARE

## 2025-03-12 DIAGNOSIS — N50.82 SCROTAL PAIN: ICD-10-CM

## 2025-03-12 DIAGNOSIS — N49.2 NODULE OF SCROTUM: ICD-10-CM

## 2025-03-12 PROCEDURE — 93975 VASCULAR STUDY: CPT

## 2025-04-10 DIAGNOSIS — Z12.5 ENCOUNTER FOR SCREENING FOR MALIGNANT NEOPLASM OF PROSTATE: ICD-10-CM

## 2025-04-10 DIAGNOSIS — E78.00 HYPERCHOLESTEROLEMIA: ICD-10-CM

## 2025-04-10 DIAGNOSIS — Z00.00 HEALTHCARE MAINTENANCE: ICD-10-CM

## 2025-04-30 ENCOUNTER — APPOINTMENT (OUTPATIENT)
Dept: URBAN - METROPOLITAN AREA CLINIC 204 | Age: 75
Setting detail: DERMATOLOGY
End: 2025-04-30

## 2025-04-30 DIAGNOSIS — D22 MELANOCYTIC NEVI: ICD-10-CM

## 2025-04-30 DIAGNOSIS — L82.1 OTHER SEBORRHEIC KERATOSIS: ICD-10-CM

## 2025-04-30 DIAGNOSIS — D485 NEOPLASM OF UNCERTAIN BEHAVIOR OF SKIN: ICD-10-CM

## 2025-04-30 DIAGNOSIS — L57.8 OTHER SKIN CHANGES DUE TO CHRONIC EXPOSURE TO NONIONIZING RADIATION: ICD-10-CM

## 2025-04-30 PROBLEM — D22.5 MELANOCYTIC NEVI OF TRUNK: Status: ACTIVE | Noted: 2025-04-30

## 2025-04-30 PROBLEM — D48.5 NEOPLASM OF UNCERTAIN BEHAVIOR OF SKIN: Status: ACTIVE | Noted: 2025-04-30

## 2025-04-30 PROCEDURE — OTHER MIPS QUALITY: OTHER

## 2025-04-30 PROCEDURE — OTHER DEFER: OTHER

## 2025-04-30 PROCEDURE — 99203 OFFICE O/P NEW LOW 30 MIN: CPT

## 2025-04-30 PROCEDURE — OTHER COUNSELING: OTHER

## 2025-04-30 ASSESSMENT — LOCATION DETAILED DESCRIPTION DERM
LOCATION DETAILED: LEFT SUPERIOR MEDIAL UPPER BACK
LOCATION DETAILED: LEFT CLAVICULAR NECK
LOCATION DETAILED: SUPERIOR THORACIC SPINE
LOCATION DETAILED: LEFT ANTERIOR SHOULDER

## 2025-04-30 ASSESSMENT — LOCATION ZONE DERM
LOCATION ZONE: TRUNK
LOCATION ZONE: ARM
LOCATION ZONE: NECK

## 2025-04-30 ASSESSMENT — LOCATION SIMPLE DESCRIPTION DERM
LOCATION SIMPLE: LEFT UPPER BACK
LOCATION SIMPLE: UPPER BACK
LOCATION SIMPLE: LEFT SHOULDER
LOCATION SIMPLE: LEFT ANTERIOR NECK

## 2025-05-02 ENCOUNTER — APPOINTMENT (OUTPATIENT)
Dept: UROLOGY | Facility: CLINIC | Age: 75
End: 2025-05-02
Payer: MEDICARE

## 2025-05-06 ENCOUNTER — APPOINTMENT (OUTPATIENT)
Dept: URBAN - METROPOLITAN AREA CLINIC 204 | Age: 75
Setting detail: DERMATOLOGY
End: 2025-05-06

## 2025-05-06 DIAGNOSIS — D485 NEOPLASM OF UNCERTAIN BEHAVIOR OF SKIN: ICD-10-CM

## 2025-05-06 PROBLEM — C44.41 BASAL CELL CARCINOMA OF SKIN OF SCALP AND NECK: Status: ACTIVE | Noted: 2025-05-06

## 2025-05-06 PROCEDURE — OTHER BIOPSY BY SHAVE METHOD: OTHER

## 2025-05-06 PROCEDURE — OTHER CURETTAGE AND DESTRUCTION: OTHER

## 2025-05-06 PROCEDURE — OTHER MIPS QUALITY: OTHER

## 2025-05-06 PROCEDURE — 17272 DSTR MAL LES S/N/H/F/G 1.1-2: CPT

## 2025-05-06 ASSESSMENT — LOCATION SIMPLE DESCRIPTION DERM: LOCATION SIMPLE: LEFT ANTERIOR NECK

## 2025-05-06 ASSESSMENT — LOCATION ZONE DERM: LOCATION ZONE: NECK

## 2025-05-06 ASSESSMENT — LOCATION DETAILED DESCRIPTION DERM: LOCATION DETAILED: LEFT CLAVICULAR NECK

## 2025-05-09 LAB
ALBUMIN SERPL-MCNC: 4.2 G/DL (ref 3.6–5.1)
ALP SERPL-CCNC: 39 U/L (ref 35–144)
ALT SERPL-CCNC: 25 U/L (ref 9–46)
ANION GAP SERPL CALCULATED.4IONS-SCNC: 8 MMOL/L (CALC) (ref 7–17)
AST SERPL-CCNC: 21 U/L (ref 10–35)
BILIRUB SERPL-MCNC: 0.6 MG/DL (ref 0.2–1.2)
BUN SERPL-MCNC: 11 MG/DL (ref 7–25)
CALCIUM SERPL-MCNC: 8.8 MG/DL (ref 8.6–10.3)
CHLORIDE SERPL-SCNC: 106 MMOL/L (ref 98–110)
CHOLEST SERPL-MCNC: 129 MG/DL
CHOLEST/HDLC SERPL: 2.3 (CALC)
CO2 SERPL-SCNC: 25 MMOL/L (ref 20–32)
CREAT SERPL-MCNC: 0.87 MG/DL (ref 0.7–1.28)
EGFRCR SERPLBLD CKD-EPI 2021: 91 ML/MIN/1.73M2
ERYTHROCYTE [DISTWIDTH] IN BLOOD BY AUTOMATED COUNT: 12.3 % (ref 11–15)
GLUCOSE SERPL-MCNC: 90 MG/DL (ref 65–99)
HCT VFR BLD AUTO: 44.6 % (ref 38.5–50)
HDLC SERPL-MCNC: 55 MG/DL
HGB BLD-MCNC: 15.1 G/DL (ref 13.2–17.1)
LDLC SERPL CALC-MCNC: 61 MG/DL (CALC)
MCH RBC QN AUTO: 32.6 PG (ref 27–33)
MCHC RBC AUTO-ENTMCNC: 33.9 G/DL (ref 32–36)
MCV RBC AUTO: 96.3 FL (ref 80–100)
NONHDLC SERPL-MCNC: 74 MG/DL (CALC)
PLATELET # BLD AUTO: 228 THOUSAND/UL (ref 140–400)
PMV BLD REES-ECKER: 10.3 FL (ref 7.5–12.5)
POTASSIUM SERPL-SCNC: 4.5 MMOL/L (ref 3.5–5.3)
PROT SERPL-MCNC: 6.4 G/DL (ref 6.1–8.1)
PSA SERPL-MCNC: 0.97 NG/ML
RBC # BLD AUTO: 4.63 MILLION/UL (ref 4.2–5.8)
SODIUM SERPL-SCNC: 139 MMOL/L (ref 135–146)
TRIGL SERPL-MCNC: 57 MG/DL
WBC # BLD AUTO: 6.4 THOUSAND/UL (ref 3.8–10.8)

## 2025-05-16 RX ORDER — ATORVASTATIN CALCIUM 40 MG/1
40 TABLET, FILM COATED ORAL DAILY
COMMUNITY
Start: 2025-03-07

## 2025-05-21 ENCOUNTER — APPOINTMENT (OUTPATIENT)
Dept: PRIMARY CARE | Facility: CLINIC | Age: 75
End: 2025-05-21
Payer: MEDICARE

## 2025-05-21 VITALS
SYSTOLIC BLOOD PRESSURE: 120 MMHG | DIASTOLIC BLOOD PRESSURE: 78 MMHG | OXYGEN SATURATION: 97 % | WEIGHT: 187.2 LBS | HEART RATE: 67 BPM | BODY MASS INDEX: 27.73 KG/M2

## 2025-05-21 DIAGNOSIS — Z12.5 ENCOUNTER FOR SCREENING FOR MALIGNANT NEOPLASM OF PROSTATE: ICD-10-CM

## 2025-05-21 DIAGNOSIS — I42.8 NONISCHEMIC CARDIOMYOPATHY (MULTI): ICD-10-CM

## 2025-05-21 DIAGNOSIS — Z87.891 PERSONAL HISTORY OF TOBACCO USE, PRESENTING HAZARDS TO HEALTH: ICD-10-CM

## 2025-05-21 DIAGNOSIS — Z00.00 HEALTHCARE MAINTENANCE: ICD-10-CM

## 2025-05-21 DIAGNOSIS — R68.82 LOW LIBIDO: ICD-10-CM

## 2025-05-21 DIAGNOSIS — Z00.00 ROUTINE GENERAL MEDICAL EXAMINATION AT HEALTH CARE FACILITY: Primary | ICD-10-CM

## 2025-05-21 DIAGNOSIS — E78.00 HYPERCHOLESTEROLEMIA: ICD-10-CM

## 2025-05-21 PROCEDURE — 1159F MED LIST DOCD IN RCRD: CPT | Performed by: FAMILY MEDICINE

## 2025-05-21 PROCEDURE — 99214 OFFICE O/P EST MOD 30 MIN: CPT | Performed by: FAMILY MEDICINE

## 2025-05-21 PROCEDURE — 1170F FXNL STATUS ASSESSED: CPT | Performed by: FAMILY MEDICINE

## 2025-05-21 PROCEDURE — G0439 PPPS, SUBSEQ VISIT: HCPCS | Performed by: FAMILY MEDICINE

## 2025-05-21 PROCEDURE — 1160F RVW MEDS BY RX/DR IN RCRD: CPT | Performed by: FAMILY MEDICINE

## 2025-05-21 PROCEDURE — 1036F TOBACCO NON-USER: CPT | Performed by: FAMILY MEDICINE

## 2025-05-21 ASSESSMENT — ENCOUNTER SYMPTOMS
DEPRESSION: 0
POLYPHAGIA: 0
PALPITATIONS: 1
CONSTIPATION: 0
OCCASIONAL FEELINGS OF UNSTEADINESS: 0
DIFFICULTY URINATING: 0
POLYDIPSIA: 0
DIARRHEA: 0
BLOOD IN STOOL: 0
LOSS OF SENSATION IN FEET: 0

## 2025-05-21 ASSESSMENT — ACTIVITIES OF DAILY LIVING (ADL)
GROCERY_SHOPPING: INDEPENDENT
DOING_HOUSEWORK: INDEPENDENT
DRESSING: INDEPENDENT
TAKING_MEDICATION: INDEPENDENT
BATHING: INDEPENDENT
MANAGING_FINANCES: INDEPENDENT

## 2025-05-23 LAB — TESTOST SERPL-MCNC: 893 NG/DL (ref 250–827)

## 2025-06-11 ENCOUNTER — HOSPITAL ENCOUNTER (OUTPATIENT)
Dept: RADIOLOGY | Facility: HOSPITAL | Age: 75
Discharge: HOME | End: 2025-06-11
Payer: MEDICARE

## 2025-06-11 DIAGNOSIS — Z87.891 PERSONAL HISTORY OF TOBACCO USE, PRESENTING HAZARDS TO HEALTH: ICD-10-CM

## 2025-06-11 PROCEDURE — 71271 CT THORAX LUNG CANCER SCR C-: CPT | Performed by: RADIOLOGY

## 2025-06-11 PROCEDURE — 71271 CT THORAX LUNG CANCER SCR C-: CPT

## 2026-05-21 ENCOUNTER — APPOINTMENT (OUTPATIENT)
Dept: PRIMARY CARE | Facility: CLINIC | Age: 76
End: 2026-05-21
Payer: MEDICARE

## (undated) DEVICE — COVER LT HNDL BLU PLAS

## (undated) DEVICE — YANKAUER,SMOOTH HANDLE,HIGH CAPACITY: Brand: MEDLINE INDUSTRIES, INC.

## (undated) DEVICE — 3M™ STERI-DRAPE™ U-DRAPE 1015: Brand: STERI-DRAPE™

## (undated) DEVICE — ELECTRODE PT RET AD L9FT HI MOIST COND ADH HYDRGEL CORDED

## (undated) DEVICE — GLOVE SURG SZ 8 L12IN FNGR THK79MIL GRN LTX FREE

## (undated) DEVICE — 1010 S-DRAPE TOWEL DRAPE 10/BX: Brand: STERI-DRAPE™

## (undated) DEVICE — SHEET,DRAPE,53X77,STERILE: Brand: MEDLINE

## (undated) DEVICE — GOWN,SIRUS,POLYRNF,BRTHSLV,XLN/XL,20/CS: Brand: MEDLINE

## (undated) DEVICE — 450 ML BOTTLE OF 0.05% CHLORHEXIDINE GLUCONATE IN 99.95% STERILE WATER FOR IRRIGATION, USP AND APPLICATOR.: Brand: IRRISEPT ANTIMICROBIAL WOUND LAVAGE

## (undated) DEVICE — LABEL MED MINI W/ MARKER

## (undated) DEVICE — ALCOHOL RUBBING ISO 16OZ 70%

## (undated) DEVICE — SUTURE FIBERWIRE SZ 2 W/ TAPERED NEEDLE BLUE L38IN NONABSORB BLU L26.5MM 1/2 CIRCLE AR7200

## (undated) DEVICE — BLADE SAW W071XL354IN THK0047IN CUT THK0053IN REPL SAG

## (undated) DEVICE — SPONGE,LAP,18"X18",DLX,XR,ST,5/PK,40/PK: Brand: MEDLINE

## (undated) DEVICE — SUTURE ETHBND EXCEL SZ 0 L30IN NONABSORBABLE GRN CT1 L36MM X424H

## (undated) DEVICE — 3M™ STERI-DRAPE™ INSTRUMENT POUCH 1018: Brand: STERI-DRAPE™

## (undated) DEVICE — SUTURE MCRYL SZ 3-0 L27IN ABSRB UD L19MM PS-2 3/8 CIR PRIM Y427H

## (undated) DEVICE — DRESSING HYDROFIBER AQUACEL AG ADVANTAGE 3.5X10 IN

## (undated) DEVICE — COUNTER NDL 40 COUNT HLD 70 FOAM BLK ADH W/ MAG

## (undated) DEVICE — SPONGE GZ W4XL4IN COT 12 PLY TYP VII WVN C FLD DSGN STERILE

## (undated) DEVICE — NEPTUNE E-SEP SMOKE EVACUATION PENCIL, COATED, 70MM BLADE, PUSH BUTTON SWITCH: Brand: NEPTUNE E-SEP

## (undated) DEVICE — LIQUIBAND RAPID ADHESIVE 36/CS 0.8ML: Brand: MEDLINE

## (undated) DEVICE — BANDAGE COBAN 4 IN COMPR W4INXL5YD FOAM COHESIVE QUIK STK SELF ADH SFT

## (undated) DEVICE — SUTURE VCRL SZ 2-0 L36IN ABSRB UD L36MM CT-1 1/2 CIR J945H

## (undated) DEVICE — SHEET, DRAPE, SPLIT, STERILE: Brand: MEDLINE

## (undated) DEVICE — GOWN,AURORA,NONREINFORCED,LARGE: Brand: MEDLINE

## (undated) DEVICE — TOWEL,OR,DSP,ST,BLUE,STD,4/PK,20PK/CS: Brand: MEDLINE

## (undated) DEVICE — DRAPE,U/ SHT,SPLIT,PLAS,STERIL: Brand: MEDLINE

## (undated) DEVICE — MARKER SURG SKIN GENTIAN VLT REG TIP W/ 6IN RUL DYNJSM01

## (undated) DEVICE — KIT CHAIR TRIMANO FOAM W/ SUPP ARM DRP ERGONOMICALLY DESIGNED

## (undated) DEVICE — SUTURE NICELOOP SZ 5 L24IN NONABSORBABLE WHT KAC-25 L49MM SMSL50201

## (undated) DEVICE — POSITIONER PT UNIV HD RESTRN DISP

## (undated) DEVICE — TUBING, SUCTION, 9/32" X 12', STRAIGHT: Brand: MEDLINE INDUSTRIES, INC.

## (undated) DEVICE — FAN SPRAY KIT: Brand: PULSAVAC®

## (undated) DEVICE — 3M™ IOBAN™ 2 ANTIMICROBIAL INCISE DRAPE 6650EZ: Brand: IOBAN™ 2

## (undated) DEVICE — HOOD: Brand: T7PLUS

## (undated) DEVICE — GLOVE ORANGE PI 7   MSG9070

## (undated) DEVICE — BLADE,CARBON-STEEL,10,STRL,DISPOSABLE,TB: Brand: MEDLINE

## (undated) DEVICE — 4-PORT MANIFOLD: Brand: NEPTUNE 2

## (undated) DEVICE — BIT DRL SCREW 3.2 MM PERIPH STRL

## (undated) DEVICE — ELECTRODE ES L275IN 275IN BLDE TIP COAT PTFE TEF W EVAC

## (undated) DEVICE — STOCKINETTE,IMPERVIOUS,12X48,STERILE: Brand: MEDLINE

## (undated) DEVICE — PACK,ORTHOPEDIC I: Brand: MEDLINE

## (undated) DEVICE — GUIDEPIN ORTH 2.5X220 MM SHLDR AEQUALIS PERFORM+

## (undated) DEVICE — GLOVE ORANGE PI 7 1/2   MSG9075

## (undated) DEVICE — COVER,TABLE,44X90,STERILE: Brand: MEDLINE

## (undated) DEVICE — SUTURE ABSORBABLE BRAIDED 0 CT-1 8X27 IN UD VICRYL JJ41G

## (undated) DEVICE — APPLICATOR MEDICATED 26 CC SOLUTION HI LT ORNG CHLORAPREP